# Patient Record
Sex: FEMALE | Race: WHITE | URBAN - METROPOLITAN AREA
[De-identification: names, ages, dates, MRNs, and addresses within clinical notes are randomized per-mention and may not be internally consistent; named-entity substitution may affect disease eponyms.]

---

## 2017-01-10 ENCOUNTER — EMERGENCY (EMERGENCY)
Facility: HOSPITAL | Age: 57
LOS: 0 days | Discharge: HOME | End: 2017-01-10
Admitting: EMERGENCY MEDICINE
Payer: COMMERCIAL

## 2017-01-10 DIAGNOSIS — R07.9 CHEST PAIN, UNSPECIFIED: ICD-10-CM

## 2017-01-10 DIAGNOSIS — R06.00 DYSPNEA, UNSPECIFIED: ICD-10-CM

## 2017-01-10 DIAGNOSIS — R06.02 SHORTNESS OF BREATH: ICD-10-CM

## 2017-01-10 PROCEDURE — 71020: CPT | Mod: 26

## 2017-01-10 PROCEDURE — 93010 ELECTROCARDIOGRAM REPORT: CPT

## 2017-01-10 PROCEDURE — 99285 EMERGENCY DEPT VISIT HI MDM: CPT

## 2019-04-03 ENCOUNTER — TRANSCRIPTION ENCOUNTER (OUTPATIENT)
Age: 59
End: 2019-04-03

## 2019-07-09 ENCOUNTER — INPATIENT (INPATIENT)
Facility: HOSPITAL | Age: 59
LOS: 0 days | Discharge: HOME | End: 2019-07-10
Attending: INTERNAL MEDICINE | Admitting: FAMILY MEDICINE
Payer: COMMERCIAL

## 2019-07-09 VITALS — HEIGHT: 66 IN | WEIGHT: 160.06 LBS

## 2019-07-09 DIAGNOSIS — Z98.890 OTHER SPECIFIED POSTPROCEDURAL STATES: Chronic | ICD-10-CM

## 2019-07-09 LAB
ALBUMIN SERPL ELPH-MCNC: 4.4 G/DL — SIGNIFICANT CHANGE UP (ref 3.3–5)
ALP SERPL-CCNC: 76 U/L — SIGNIFICANT CHANGE UP (ref 40–120)
ALT FLD-CCNC: 24 U/L — SIGNIFICANT CHANGE UP (ref 12–78)
ANION GAP SERPL CALC-SCNC: 7 MMOL/L — SIGNIFICANT CHANGE UP (ref 5–17)
APTT BLD: 28 SEC — SIGNIFICANT CHANGE UP (ref 27.5–36.3)
AST SERPL-CCNC: 27 U/L — SIGNIFICANT CHANGE UP (ref 15–37)
BASOPHILS # BLD AUTO: 0.05 K/UL — SIGNIFICANT CHANGE UP (ref 0–0.2)
BASOPHILS NFR BLD AUTO: 0.8 % — SIGNIFICANT CHANGE UP (ref 0–2)
BILIRUB SERPL-MCNC: 0.5 MG/DL — SIGNIFICANT CHANGE UP (ref 0.2–1.2)
BUN SERPL-MCNC: 8 MG/DL — SIGNIFICANT CHANGE UP (ref 7–23)
CALCIUM SERPL-MCNC: 9 MG/DL — SIGNIFICANT CHANGE UP (ref 8.5–10.1)
CHLORIDE SERPL-SCNC: 106 MMOL/L — SIGNIFICANT CHANGE UP (ref 96–108)
CO2 SERPL-SCNC: 28 MMOL/L — SIGNIFICANT CHANGE UP (ref 22–31)
CREAT SERPL-MCNC: 0.98 MG/DL — SIGNIFICANT CHANGE UP (ref 0.5–1.3)
EOSINOPHIL # BLD AUTO: 0.1 K/UL — SIGNIFICANT CHANGE UP (ref 0–0.5)
EOSINOPHIL NFR BLD AUTO: 1.6 % — SIGNIFICANT CHANGE UP (ref 0–6)
GLUCOSE SERPL-MCNC: 99 MG/DL — SIGNIFICANT CHANGE UP (ref 70–99)
HCT VFR BLD CALC: 44.5 % — SIGNIFICANT CHANGE UP (ref 34.5–45)
HGB BLD-MCNC: 14.6 G/DL — SIGNIFICANT CHANGE UP (ref 11.5–15.5)
IMM GRANULOCYTES NFR BLD AUTO: 0.5 % — SIGNIFICANT CHANGE UP (ref 0–1.5)
INR BLD: 0.96 RATIO — SIGNIFICANT CHANGE UP (ref 0.88–1.16)
LYMPHOCYTES # BLD AUTO: 1.94 K/UL — SIGNIFICANT CHANGE UP (ref 1–3.3)
LYMPHOCYTES # BLD AUTO: 31.3 % — SIGNIFICANT CHANGE UP (ref 13–44)
MCHC RBC-ENTMCNC: 30.3 PG — SIGNIFICANT CHANGE UP (ref 27–34)
MCHC RBC-ENTMCNC: 32.8 GM/DL — SIGNIFICANT CHANGE UP (ref 32–36)
MCV RBC AUTO: 92.3 FL — SIGNIFICANT CHANGE UP (ref 80–100)
MONOCYTES # BLD AUTO: 0.33 K/UL — SIGNIFICANT CHANGE UP (ref 0–0.9)
MONOCYTES NFR BLD AUTO: 5.3 % — SIGNIFICANT CHANGE UP (ref 2–14)
NEUTROPHILS # BLD AUTO: 3.75 K/UL — SIGNIFICANT CHANGE UP (ref 1.8–7.4)
NEUTROPHILS NFR BLD AUTO: 60.5 % — SIGNIFICANT CHANGE UP (ref 43–77)
PLATELET # BLD AUTO: 229 K/UL — SIGNIFICANT CHANGE UP (ref 150–400)
POTASSIUM SERPL-MCNC: 3.9 MMOL/L — SIGNIFICANT CHANGE UP (ref 3.5–5.3)
POTASSIUM SERPL-SCNC: 3.9 MMOL/L — SIGNIFICANT CHANGE UP (ref 3.5–5.3)
PROT SERPL-MCNC: 8 GM/DL — SIGNIFICANT CHANGE UP (ref 6–8.3)
PROTHROM AB SERPL-ACNC: 10.6 SEC — SIGNIFICANT CHANGE UP (ref 10–12.9)
RBC # BLD: 4.82 M/UL — SIGNIFICANT CHANGE UP (ref 3.8–5.2)
RBC # FLD: 12.3 % — SIGNIFICANT CHANGE UP (ref 10.3–14.5)
SODIUM SERPL-SCNC: 141 MMOL/L — SIGNIFICANT CHANGE UP (ref 135–145)
WBC # BLD: 6.2 K/UL — SIGNIFICANT CHANGE UP (ref 3.8–10.5)
WBC # FLD AUTO: 6.2 K/UL — SIGNIFICANT CHANGE UP (ref 3.8–10.5)

## 2019-07-09 PROCEDURE — 70498 CT ANGIOGRAPHY NECK: CPT | Mod: 26

## 2019-07-09 PROCEDURE — 93010 ELECTROCARDIOGRAM REPORT: CPT

## 2019-07-09 PROCEDURE — 99285 EMERGENCY DEPT VISIT HI MDM: CPT

## 2019-07-09 PROCEDURE — 71045 X-RAY EXAM CHEST 1 VIEW: CPT | Mod: 26

## 2019-07-09 PROCEDURE — 70450 CT HEAD/BRAIN W/O DYE: CPT | Mod: 26,59

## 2019-07-09 PROCEDURE — 70551 MRI BRAIN STEM W/O DYE: CPT | Mod: 26

## 2019-07-09 PROCEDURE — 70496 CT ANGIOGRAPHY HEAD: CPT | Mod: 26

## 2019-07-09 RX ORDER — SODIUM CHLORIDE 9 MG/ML
1000 INJECTION INTRAMUSCULAR; INTRAVENOUS; SUBCUTANEOUS
Refills: 0 | Status: DISCONTINUED | OUTPATIENT
Start: 2019-07-09 | End: 2019-07-09

## 2019-07-09 RX ORDER — ALPRAZOLAM 0.25 MG
0.25 TABLET ORAL THREE TIMES A DAY
Refills: 0 | Status: DISCONTINUED | OUTPATIENT
Start: 2019-07-09 | End: 2019-07-10

## 2019-07-09 RX ORDER — ONDANSETRON 8 MG/1
4 TABLET, FILM COATED ORAL EVERY 4 HOURS
Refills: 0 | Status: DISCONTINUED | OUTPATIENT
Start: 2019-07-09 | End: 2019-07-10

## 2019-07-09 RX ORDER — ATORVASTATIN CALCIUM 80 MG/1
20 TABLET, FILM COATED ORAL AT BEDTIME
Refills: 0 | Status: DISCONTINUED | OUTPATIENT
Start: 2019-07-09 | End: 2019-07-10

## 2019-07-09 RX ORDER — DOCUSATE SODIUM 100 MG
100 CAPSULE ORAL THREE TIMES A DAY
Refills: 0 | Status: DISCONTINUED | OUTPATIENT
Start: 2019-07-09 | End: 2019-07-10

## 2019-07-09 RX ORDER — FENTANYL CITRATE 50 UG/ML
1 INJECTION INTRAVENOUS
Refills: 0 | Status: DISCONTINUED | OUTPATIENT
Start: 2019-07-09 | End: 2019-07-10

## 2019-07-09 RX ORDER — ASPIRIN/CALCIUM CARB/MAGNESIUM 324 MG
81 TABLET ORAL DAILY
Refills: 0 | Status: DISCONTINUED | OUTPATIENT
Start: 2019-07-10 | End: 2019-07-10

## 2019-07-09 RX ORDER — FENTANYL CITRATE 50 UG/ML
1 INJECTION INTRAVENOUS
Refills: 0 | Status: DISCONTINUED | OUTPATIENT
Start: 2019-07-09 | End: 2019-07-09

## 2019-07-09 RX ORDER — MECLIZINE HCL 12.5 MG
25 TABLET ORAL THREE TIMES A DAY
Refills: 0 | Status: DISCONTINUED | OUTPATIENT
Start: 2019-07-09 | End: 2019-07-10

## 2019-07-09 RX ORDER — ASPIRIN/CALCIUM CARB/MAGNESIUM 324 MG
325 TABLET ORAL ONCE
Refills: 0 | Status: COMPLETED | OUTPATIENT
Start: 2019-07-09 | End: 2019-07-09

## 2019-07-09 RX ORDER — NICOTINE POLACRILEX 2 MG
1 GUM BUCCAL DAILY
Refills: 0 | Status: DISCONTINUED | OUTPATIENT
Start: 2019-07-09 | End: 2019-07-10

## 2019-07-09 RX ORDER — OXYCODONE AND ACETAMINOPHEN 5; 325 MG/1; MG/1
2 TABLET ORAL EVERY 6 HOURS
Refills: 0 | Status: DISCONTINUED | OUTPATIENT
Start: 2019-07-09 | End: 2019-07-10

## 2019-07-09 RX ORDER — LANOLIN ALCOHOL/MO/W.PET/CERES
5 CREAM (GRAM) TOPICAL ONCE
Refills: 0 | Status: COMPLETED | OUTPATIENT
Start: 2019-07-09 | End: 2019-07-09

## 2019-07-09 RX ORDER — ACETAMINOPHEN 500 MG
650 TABLET ORAL EVERY 6 HOURS
Refills: 0 | Status: DISCONTINUED | OUTPATIENT
Start: 2019-07-09 | End: 2019-07-10

## 2019-07-09 RX ORDER — CYCLOBENZAPRINE HYDROCHLORIDE 10 MG/1
5 TABLET, FILM COATED ORAL THREE TIMES A DAY
Refills: 0 | Status: DISCONTINUED | OUTPATIENT
Start: 2019-07-09 | End: 2019-07-10

## 2019-07-09 RX ORDER — FENTANYL CITRATE 50 UG/ML
1 INJECTION INTRAVENOUS
Refills: 0 | Status: DISCONTINUED | OUTPATIENT
Start: 2019-07-11 | End: 2019-07-10

## 2019-07-09 RX ORDER — PANTOPRAZOLE SODIUM 20 MG/1
40 TABLET, DELAYED RELEASE ORAL
Refills: 0 | Status: DISCONTINUED | OUTPATIENT
Start: 2019-07-09 | End: 2019-07-10

## 2019-07-09 RX ADMIN — Medication 1 TABLET(S): at 20:19

## 2019-07-09 RX ADMIN — Medication 5 MILLIGRAM(S): at 23:40

## 2019-07-09 RX ADMIN — ATORVASTATIN CALCIUM 20 MILLIGRAM(S): 80 TABLET, FILM COATED ORAL at 22:40

## 2019-07-09 RX ADMIN — OXYCODONE AND ACETAMINOPHEN 2 TABLET(S): 5; 325 TABLET ORAL at 22:40

## 2019-07-09 RX ADMIN — Medication 325 MILLIGRAM(S): at 16:43

## 2019-07-09 RX ADMIN — SODIUM CHLORIDE 125 MILLILITER(S): 9 INJECTION INTRAMUSCULAR; INTRAVENOUS; SUBCUTANEOUS at 16:36

## 2019-07-09 RX ADMIN — OXYCODONE AND ACETAMINOPHEN 2 TABLET(S): 5; 325 TABLET ORAL at 23:10

## 2019-07-09 RX ADMIN — Medication 1 PATCH: at 20:16

## 2019-07-09 RX ADMIN — FENTANYL CITRATE 1 PATCH: 50 INJECTION INTRAVENOUS at 21:08

## 2019-07-09 NOTE — H&P ADULT - ASSESSMENT
60 yo female with pmh/o HLD, Migrane headaches, PVC/PAC's, admitted for TIA in setting of vertigo associated with transient parasthesias/blurry vision/unstable gait/vertigo.     1) TIA  Symptoms have since resolved  Admit to telemetry  CT head reviewed-no acute findings  MRI/MRA ordered  TTE ordered  HbA1c and lipid panel  fall and aspiration precautions  Bedside swallow eval passed  ASA and statin started  Lowfat DASH/TLC diet  PT evaluation  Neurochecks q 4 hrs  cbc, cmp, coags for AM  VCD boots for DVT prophylaxis  Allow for permissive HTN    2) HLD  Cont. statin  DASH/TLC diet     3) Migraine Headaches  Pain control  Currently not in exacerbation    4) Need for prophylactic measure  VCD boots  Ambulate with assistance 58 yo female with pmh/o HLD, Migrane headaches, PVC/PAC's, admitted for concern of TIA in setting of vertigo associated with transient parasthesias/blurry vision/unstable gait/vertigo.     1) Intractable vertigo concerning for TIA  Symptoms have since resolved  Admit to telemetry  CT head reviewed-no acute findings  MRI/MRA ordered  TTE ordered  HbA1c and lipid panel  fall and aspiration precautions  Bedside swallow eval passed  ASA and statin started  Lowfat DASH/TLC diet  PT evaluation  Neurochecks q 4 hrs  cbc, cmp, coags for AM  VCD boots for DVT prophylaxis  Allow for permissive HTN    2) HLD  Cont. statin  DASH/TLC diet     3) Migraine Headaches  Pain control  Currently not in exacerbation    4) Need for prophylactic measure  VCD boots  Ambulate with assistance 58 yo female with pmh/o HLD, Migrane headaches, PVC/PAC's, admitted for concern of CVA in setting of vertigo associated with transient parasthesias/blurry vision/unstable gait/vertigo.     1) Intractable vertigo concerning for CVA  Symptoms have since resolved  Admit to telemetry  Neurology consult appreciated- Dr. Nichols  CT head reviewed-no acute findings  MRI/MRA ordered  TTE ordered  HbA1c and lipid panel  fall and aspiration precautions  Bedside swallow eval passed  ASA and statin started  Meclizine prn   Zofran prn  Lowfat DASH/TLC diet  PT evaluation  Neurochecks q 4 hrs  cbc, cmp, coags for AM  VCD boots for DVT prophylaxis  Allow for permissive HTN    2) HLD  Cont. statin  DASH/TLC diet     3) Migraine Headaches  Pain control  Currently not in exacerbation    4) Need for prophylactic measure  VCD boots  Ambulate with assistance 60 yo female with pmh/o HLD, Migrane headaches, PVC/PAC's, admitted for concern of CVA in setting of vertigo associated with transient parasthesias/blurry vision/unstable gait/vertigo.     1) Intractable vertigo concerning for CVA  Symptoms have since resolved  Admit to telemetry  Neurology consult appreciated- Dr. Nichols  CT head reviewed-no acute findings  MRI ordered  TTE ordered  HbA1c and lipid panel  fall and aspiration precautions  Bedside swallow eval passed  ASA and statin started  Meclizine prn   Zofran prn  Lowfat DASH/TLC diet  PT evaluation  Neurochecks q 4 hrs  cbc, cmp, coags for AM  VCD boots for DVT prophylaxis  Allow for permissive HTN    2) HLD  Statin started  DASH/TLC diet     3) Migraine Headaches  Pain control  Currently not in exacerbation    4) Smoker with h/o COPD  cessation counseling provided  risks of e-cig usage explained  pathophysiology of COPD explained-pt states she already has 'scar tissue' in lungs and was not aware that COPD is a chronic disease, pt now agreeable to stop smokeless tobacco use    5) Fibromyalgia/Lumbago  cont percocet  cont xanax  cont fentanyl  ISTOP confirmed- pt no longer seeing Daquan, now seeing Landon regularly    6) Need for prophylactic measure  VCD boots  Ambulate with assistance

## 2019-07-09 NOTE — ED STATDOCS - PROGRESS NOTE DETAILS
signed Sonya Neville PA-C Pt seen initially in intake by Dr Anders.   59F c/o severe imbalance, feeling like she was on a boat, unable to walk since she woke up this morning around 7:30am, she felt normal when she went to bed at 2am. Pt also notes it felt like everything was spinning to the left today. Pt notes that yesterday after she woke up around 7 or 8am and had a sudden, severe sensation that her face and body were being pulled to the left, her vision went blurry and the room was spinning. Lasted about 2-5 seconds. pt notes she felt a little "off" the rest of the day. PMH ocular migraines, tachycardia which she takes metoprolol for. pt notes her symptoms are improving today from this morning though she is still unsteady on her feet. Denies HA, pain, SOB. Pt alert, NAD. Neuro exam significant for +LUE dysmetria and LUE drift, slightly ataxic gait. No significant findings on labs, imaging, or EKG. Discussed case with Dr Nichols, will see pt in ED in consult, advises Aspirin, inpt MRI. Pt agrees with admission and plan of care. PMD- none, Card Norma, neuro- none signed Sonya Neville PA-C   Alerted by nursing pt had change in mental status while having an IV adjusted/placed. pt seemed to lose consciousness for a few seconds, then awoke and was confused where she was. Pt returned to normal within 1-2 minutes. fingerstick 89. pt sent to Main ED for further monitoring. Symptoms may have been due to vagal response, pt and  both notes she is "bad with needles". pt admitted to hospitalist for inpt workup r/o stroke by Dr Anders.

## 2019-07-09 NOTE — H&P ADULT - HISTORY OF PRESENT ILLNESS
Pt is a 59 year old female with a pmh/o HLD, COPD, vertigo, migraines, fibromyalgia, PAC's and PVC's, chronic lumbar back pain s/p disc and laminectomy, Goiter, Head injury s/p fall of horse, baseline 'double vision from near sightedness', who is an everyday smoker of approx. 18mcg of nicotene/day via e-cig use, who presented to ED after having several episodes of "severe and catastrophic vertigo" associated with sensation of the room spinning, leg weakness, gait instability, and left sided weakness which she states is worse than her baseline s/p laminectomy/lumbago. Pt states she had one episode yesterday which lasted only a few seconds and self resolved however states that she was 'severely scared' and did not ambulate the rest of the day. Pt states another episode happened today prompting her visit to the ED as she still has not regained full strength on left since yesterday. Pt denies any chest pain, palpitations, auditory or speech changes, n/v/d, constipation, HA, fever, diaphoresis, tinnitus, falls. Pt endorses decreased sensation and weakness of left side, worsening blurry vision, unsteady gait.

## 2019-07-09 NOTE — ED ADULT NURSE NOTE - NSIMPLEMENTINTERV_GEN_ALL_ED
Implemented All Universal Safety Interventions:  [place] to call system. Call bell, personal items and telephone within reach. Instruct patient to call for assistance. Room bathroom lighting operational. Non-slip footwear when patient is off stretcher. Physically safe environment: no spills, clutter or unnecessary equipment. Stretcher in lowest position, wheels locked, appropriate side rails in place.

## 2019-07-09 NOTE — H&P ADULT - NEUROLOGICAL DETAILS
cranial nerves intact/strength decreased/alert and oriented x 3/superficial reflexes intact/responds to pain/responds to verbal commands/no spontaneous movement

## 2019-07-09 NOTE — ED STATDOCS - NEUROLOGICAL, MLM
sensation is normal and strength is normal. CN II-XII intact. Normal FTN intact on the right minor dysmetria on the left. sensation is normal and strength is normal. CN II-XII intact. Normal FTN intact on the right minor dysmetria on the left. GCS=15 NIH=0 sensation is normal and strength is normal. CN II-XII intact. Normal FTN intact on the right. minor dysmetria on the left. GCS=15 NIH=0

## 2019-07-09 NOTE — ED STATDOCS - EYES, MLM
clear bilaterally.  Pupils equal, round, and reactive to light. EOMI. Visual field intact. Rotary nystagmus.

## 2019-07-09 NOTE — ED STATDOCS - OBJECTIVE STATEMENT
60 y/o F with hx of migraines, HLD, PVCs, and PACs (controlled on Metoprolol) presents to the ED with  c/o an episode of significant dizziness onset yesterday while reading the newspaper sitting down. She describes feeling a room spinning sensation and the sensation of being pulled to the left with associated blurred vision. The episode lasted ~4 seconds and was accompanied by elevated HR after.  There is no associated numbness however she describes some facial paraesthesias. After the episode pt felt completely fine and went about her day as she usually does. This morning when she awoke she again had vertigo and was very unsteady on her feet. This has not resolved today which prompted her arrival in the ED. Pt denies any fevers, chills, recent illness, CP, SOB, cough, congestion, abdominal pain, n/v/d, urinary sx's, back pain, neck pain, HA, or other musculoskeletal pain.  FmHx of Parkinson's. No further complaints at this time.

## 2019-07-09 NOTE — ED STATDOCS - MUSCULOSKELETAL, MLM
range of motion is not limited and there is no muscle tenderness. range of motion is not limited and there is no muscle tenderness. 5/5 strength on flexion and extension of all limbs. No nuchal rigidity.

## 2019-07-09 NOTE — CONSULT NOTE ADULT - ASSESSMENT
Ms. Velasquez is a 59 year old woman who had acute onset of a sensation of her body pulling to the left on 7/8/19.  Upon awakening on 7/9/19 she had symptoms of vertigo.  On examination she does have some mild left sided drift and dysmetria raising a concern for a central cause of vertigo such as stroke.  She was given aspirin.  CTA head and neck is being done and she is being admitted for MRI brain to rule out infarct.  Meclizine can be used as needed for vertigo.  Will follow.

## 2019-07-09 NOTE — H&P ADULT - NSICDXFAMILYHX_GEN_ALL_CORE_FT
FAMILY HISTORY:  Family history of Parkinson's disease FAMILY HISTORY:  Family history of neuromuscular dysfunction, in mother   Family history of Parkinson's disease, , father/grandfather

## 2019-07-09 NOTE — ED STATDOCS - CARE PLAN
Principal Discharge DX:	Vertigo  Secondary Diagnosis:	Lightheadedness  Secondary Diagnosis:	Syncope, unspecified syncope type

## 2019-07-09 NOTE — ED ADULT NURSE REASSESSMENT NOTE - NS ED NURSE REASSESS COMMENT FT1
Patient in chair, while trying to place new IV, pt syncopal episode, with confusion.  at bedside. pt continually with periods of confusion. MD aware. BGM 89. pt transfer pt to [facility]

## 2019-07-09 NOTE — H&P ADULT - NSICDXPASTMEDICALHX_GEN_ALL_CORE_FT
PAST MEDICAL HISTORY:  Fibromyalgia     HLD (hyperlipidemia)     Migraines     Premature atrial complex     Smokeless tobacco use     Ventricular premature beats

## 2019-07-09 NOTE — ED ADULT NURSE REASSESSMENT NOTE - NS ED NURSE REASSESS COMMENT FT1
pt. received in report from Ras Miranda and rounded on, pt. c/o of being cold, warm blankets applied for comfortable. pt. laying flat s/p episode of vertigo/dizziness.

## 2019-07-09 NOTE — H&P ADULT - NSHPOUTPATIENTPROVIDERS_GEN_ALL_CORE
SNEHAL Langford  Neurology  PMD   Neurology- None   Pain manangement- Dr. Meño Butt PMD Dr. Green  Neurology- None   Pain manangement- Dr. Meño Butt/Dr. Navarrete

## 2019-07-09 NOTE — CONSULT NOTE ADULT - SUBJECTIVE AND OBJECTIVE BOX
Patient is a 59y old  Female who presents with a chief complaint of vertigo.    HPI: Ms. Velasquez is a 59 year old woman who states that on 19 she was in her usual state of health until she had a "catastrophic event".  She was reading the paper while sitting down and felt a violent sensation of her whole body being pulled to the left. It lasted for only 3-5 seconds but she states that after that she had an "adrenaline dump" because it was so scary and felt off for the rest of the day.  She went to bed at about 2 AM. At about 8-9 AM she tried getting out of bed and had difficulty. Her legs gave out. They both felt weak and she had a sensation of vertigo as if the room was spinning to the left. She had difficulty walking. This sensation is slightly improved. It feels as if everything is spinning to the left. She feels slightly better when she tilts her head to the right.  She has no tinnitus at this time (has had it in the past). She is not aware of any recent viral infections, air or water travel.  She does think that she may have some mild left sided weakness but is not clear if it is related to disc disease.  She states that she has some double vision that is worse today.      PAST MEDICAL & SURGICAL HISTORY:  Smokeless tobacco use  Ventricular premature beats  Premature atrial complex  Migraines  HLD (hyperlipidemia)  Fibromyalgia  H/O discectomy  H/O laminectomy  "inflammatory conditions"      FAMILY HISTORY:  Family history of neuromuscular dysfunction: in mother   Family history of Parkinson's disease: , father/grandfather    Social Hx:  Quit cigarette smoking about 5 years ago but vapes. Drinks 1 glass of wine per night.       MEDICATIONS  (Home):  metoprolol XR 20 mg daily  Percocet 10/325 mg q6  Fentanyl patch 125 mg q48  Xanax 0.25-0.5 mg 1 tab q8 prn (states she does not take it every day)         Allergies    Effexor (Unknown)  Motrin (Hives; Fever)  Nucynta (Unknown)  predniSONE (Unknown)    Intolerances        ROS: Pertinent positives in HPI, all other ROS were reviewed and are negative.      Vital Signs Last 24 Hrs  T(C): 36.8 (2019 14:06), Max: 36.8 (2019 14:06)  T(F): 98.2 (2019 14:06), Max: 98.2 (2019 14:06)  HR: 65 (2019 14:06) (65 - 65)  BP: 138/79 (2019 14:06) (138/79 - 138/79)  BP(mean): --  RR: 18 (2019 14:06) (18 - 18)  SpO2: 100% (2019 14:06) (100% - 100%)        Constitutional: awake and alert.  HEENT: PERRLA, EOMI,   Neck: Supple.  Respiratory: Breath sounds are clear bilaterally  Cardiovascular: S1 and S2, regular / irregular rhythm  Gastrointestinal: soft, nontender  Extremities:  no edema  Vascular: Caritid Bruit - no  Musculoskeletal: no joint swelling/tenderness, no abnormal movements  Skin: No rashes    Neurological exam:  HF: A x O x 3. Appropriately interactive, normal affect. Speech fluent, No Aphasia or paraphasic errors. Naming /repetition intact   CN: DADA, EOMI, VFF, facial sensation normal, no NLFD, tongue midline, Palate moves equally, SCM equal bilaterally  Motor: Mild left pronator drift Strength 5/5 in bilateral upper extremities on confrontation testing. 5/5 in RLE, 5-/5 in proximal left lower extremity, normal bulk and tone, no tremor, rigidity or bradykinesia.    Sens: Intact to light touch  Reflexes: Symmetric and normal . BJ 2, BR 2, KJ 1, AJ trace, downgoing toes b/l  Coord:  left worse than right dysmetria on finger nose finger  Gait/Balance: not tested        Labs:       141  |  106  |  8   ----------------------------<  99  3.9   |  28  |  0.98    Ca    9.0      2019 15:30    TPro  8.0  /  Alb  4.4  /  TBili  0.5  /  DBili  x   /  AST  27  /  ALT  24  /  AlkPhos  76                                14.6   6.20  )-----------( 229      ( 2019 15:30 )             44.5       Radiology:  CT brain 19: unremarkable

## 2019-07-09 NOTE — ED STATDOCS - NS_ ATTENDINGSCRIBEDETAILS _ED_A_ED_FT
I Guanako Anders MD saw and examined the patient. Scribe documented for me and under my supervision. I have modified the scribe's documentation where necessary to reflect my history, physical exam and other relevant documentations pertinent to the care of the patient.

## 2019-07-09 NOTE — H&P ADULT - NSHPSOCIALHISTORY_GEN_ALL_CORE
ADL's independent  Uses smokeless tabacco- E cigs   ADL's independent  Uses smokeless tabacco- E cigs  Daily glass of wine

## 2019-07-09 NOTE — ED STATDOCS - ATTENDING CONTRIBUTION TO CARE
I Guanako Anders MD saw and examined the patient. MLP saw and examined the patient under my supervision. I discussed the care of the patient with MLP and agree with MLP's plan, assessment and care of the patient while in the ED.

## 2019-07-10 ENCOUNTER — TRANSCRIPTION ENCOUNTER (OUTPATIENT)
Age: 59
End: 2019-07-10

## 2019-07-10 VITALS
HEART RATE: 84 BPM | SYSTOLIC BLOOD PRESSURE: 137 MMHG | OXYGEN SATURATION: 100 % | RESPIRATION RATE: 18 BRPM | TEMPERATURE: 97 F | DIASTOLIC BLOOD PRESSURE: 71 MMHG

## 2019-07-10 LAB
ALBUMIN SERPL ELPH-MCNC: 3.5 G/DL — SIGNIFICANT CHANGE UP (ref 3.3–5)
ALP SERPL-CCNC: 68 U/L — SIGNIFICANT CHANGE UP (ref 40–120)
ALT FLD-CCNC: 22 U/L — SIGNIFICANT CHANGE UP (ref 12–78)
ANION GAP SERPL CALC-SCNC: 6 MMOL/L — SIGNIFICANT CHANGE UP (ref 5–17)
APTT BLD: 28.7 SEC — SIGNIFICANT CHANGE UP (ref 27.5–36.3)
AST SERPL-CCNC: 22 U/L — SIGNIFICANT CHANGE UP (ref 15–37)
BASOPHILS # BLD AUTO: 0.04 K/UL — SIGNIFICANT CHANGE UP (ref 0–0.2)
BASOPHILS NFR BLD AUTO: 0.6 % — SIGNIFICANT CHANGE UP (ref 0–2)
BILIRUB DIRECT SERPL-MCNC: 0.1 MG/DL — SIGNIFICANT CHANGE UP (ref 0–0.2)
BILIRUB INDIRECT FLD-MCNC: 0.4 MG/DL — SIGNIFICANT CHANGE UP (ref 0.2–1)
BILIRUB SERPL-MCNC: 0.5 MG/DL — SIGNIFICANT CHANGE UP (ref 0.2–1.2)
BUN SERPL-MCNC: 11 MG/DL — SIGNIFICANT CHANGE UP (ref 7–23)
CALCIUM SERPL-MCNC: 8.7 MG/DL — SIGNIFICANT CHANGE UP (ref 8.5–10.1)
CHLORIDE SERPL-SCNC: 109 MMOL/L — HIGH (ref 96–108)
CHOLEST SERPL-MCNC: 227 MG/DL — HIGH (ref 10–199)
CO2 SERPL-SCNC: 26 MMOL/L — SIGNIFICANT CHANGE UP (ref 22–31)
CREAT SERPL-MCNC: 0.74 MG/DL — SIGNIFICANT CHANGE UP (ref 0.5–1.3)
EOSINOPHIL # BLD AUTO: 0.25 K/UL — SIGNIFICANT CHANGE UP (ref 0–0.5)
EOSINOPHIL NFR BLD AUTO: 3.9 % — SIGNIFICANT CHANGE UP (ref 0–6)
GLUCOSE SERPL-MCNC: 85 MG/DL — SIGNIFICANT CHANGE UP (ref 70–99)
HBA1C BLD-MCNC: 5.6 % — SIGNIFICANT CHANGE UP (ref 4–5.6)
HCT VFR BLD CALC: 40.3 % — SIGNIFICANT CHANGE UP (ref 34.5–45)
HCV AB S/CO SERPL IA: 0.14 S/CO — SIGNIFICANT CHANGE UP (ref 0–0.99)
HCV AB SERPL-IMP: SIGNIFICANT CHANGE UP
HDLC SERPL-MCNC: 45 MG/DL — LOW
HGB BLD-MCNC: 13 G/DL — SIGNIFICANT CHANGE UP (ref 11.5–15.5)
IMM GRANULOCYTES NFR BLD AUTO: 0.5 % — SIGNIFICANT CHANGE UP (ref 0–1.5)
INR BLD: 1.02 RATIO — SIGNIFICANT CHANGE UP (ref 0.88–1.16)
LIPID PNL WITH DIRECT LDL SERPL: 142 MG/DL — HIGH
LYMPHOCYTES # BLD AUTO: 2.57 K/UL — SIGNIFICANT CHANGE UP (ref 1–3.3)
LYMPHOCYTES # BLD AUTO: 40.5 % — SIGNIFICANT CHANGE UP (ref 13–44)
MCHC RBC-ENTMCNC: 29.8 PG — SIGNIFICANT CHANGE UP (ref 27–34)
MCHC RBC-ENTMCNC: 32.3 GM/DL — SIGNIFICANT CHANGE UP (ref 32–36)
MCV RBC AUTO: 92.4 FL — SIGNIFICANT CHANGE UP (ref 80–100)
MONOCYTES # BLD AUTO: 0.42 K/UL — SIGNIFICANT CHANGE UP (ref 0–0.9)
MONOCYTES NFR BLD AUTO: 6.6 % — SIGNIFICANT CHANGE UP (ref 2–14)
NEUTROPHILS # BLD AUTO: 3.04 K/UL — SIGNIFICANT CHANGE UP (ref 1.8–7.4)
NEUTROPHILS NFR BLD AUTO: 47.9 % — SIGNIFICANT CHANGE UP (ref 43–77)
PLATELET # BLD AUTO: 192 K/UL — SIGNIFICANT CHANGE UP (ref 150–400)
POTASSIUM SERPL-MCNC: 4 MMOL/L — SIGNIFICANT CHANGE UP (ref 3.5–5.3)
POTASSIUM SERPL-SCNC: 4 MMOL/L — SIGNIFICANT CHANGE UP (ref 3.5–5.3)
PROT SERPL-MCNC: 6.6 GM/DL — SIGNIFICANT CHANGE UP (ref 6–8.3)
PROTHROM AB SERPL-ACNC: 11.3 SEC — SIGNIFICANT CHANGE UP (ref 10–12.9)
RBC # BLD: 4.36 M/UL — SIGNIFICANT CHANGE UP (ref 3.8–5.2)
RBC # FLD: 12.3 % — SIGNIFICANT CHANGE UP (ref 10.3–14.5)
SODIUM SERPL-SCNC: 141 MMOL/L — SIGNIFICANT CHANGE UP (ref 135–145)
TOTAL CHOLESTEROL/HDL RATIO MEASUREMENT: 5 RATIO — SIGNIFICANT CHANGE UP (ref 3.3–7.1)
TRIGL SERPL-MCNC: 198 MG/DL — HIGH (ref 10–149)
TSH SERPL-MCNC: 0.58 UU/ML — SIGNIFICANT CHANGE UP (ref 0.34–4.82)
WBC # BLD: 6.35 K/UL — SIGNIFICANT CHANGE UP (ref 3.8–10.5)
WBC # FLD AUTO: 6.35 K/UL — SIGNIFICANT CHANGE UP (ref 3.8–10.5)

## 2019-07-10 PROCEDURE — 93306 TTE W/DOPPLER COMPLETE: CPT | Mod: 26

## 2019-07-10 PROCEDURE — 99232 SBSQ HOSP IP/OBS MODERATE 35: CPT

## 2019-07-10 RX ORDER — MECLIZINE HCL 12.5 MG
1 TABLET ORAL
Qty: 36 | Refills: 1
Start: 2019-07-10 | End: 2019-08-02

## 2019-07-10 RX ORDER — MECLIZINE HCL 12.5 MG
1 TABLET ORAL
Qty: 36 | Refills: 0
Start: 2019-07-10 | End: 2019-07-21

## 2019-07-10 RX ADMIN — Medication 1 PATCH: at 05:49

## 2019-07-10 RX ADMIN — FENTANYL CITRATE 1 PATCH: 50 INJECTION INTRAVENOUS at 05:48

## 2019-07-10 RX ADMIN — OXYCODONE AND ACETAMINOPHEN 2 TABLET(S): 5; 325 TABLET ORAL at 10:46

## 2019-07-10 RX ADMIN — OXYCODONE AND ACETAMINOPHEN 2 TABLET(S): 5; 325 TABLET ORAL at 16:53

## 2019-07-10 RX ADMIN — Medication 81 MILLIGRAM(S): at 12:22

## 2019-07-10 RX ADMIN — Medication 1 PATCH: at 13:08

## 2019-07-10 RX ADMIN — Medication 25 MILLIGRAM(S): at 06:59

## 2019-07-10 RX ADMIN — Medication 1 TABLET(S): at 12:22

## 2019-07-10 NOTE — PHYSICAL THERAPY INITIAL EVALUATION ADULT - PATIENT PROFILE REVIEW, REHAB EVAL
yes/pt reports h/o migraine HAs in her youth and later occular migraines with visual effects,has h/o spinal stenosis with back surgery 2012 at [facility]

## 2019-07-10 NOTE — PHYSICAL THERAPY INITIAL EVALUATION ADULT - ACTIVE RANGE OF MOTION EXAMINATION, REHAB EVAL
bilateral  lower extremity Active ROM was WFL (within functional limits)/aurora. upper extremity Active ROM was WNL (within normal limits)

## 2019-07-10 NOTE — SWALLOW BEDSIDE ASSESSMENT ADULT - COMMENTS
The patient was admitted to  with feeling that her body was pulling to the left, ambulation difficulties and dizziness. Neuro is following and found dysmetria on left/LLE weakness on exam. See below for prior medical history. The patient was admitted to  with a feeling that her body was pulling to the left, ambulation difficulties and dizziness. Neuro is following and found dysmetria on left/LLE weakness on exam. See below for prior medical history.

## 2019-07-10 NOTE — PROGRESS NOTE ADULT - ASSESSMENT
Ms. Velasquez is a 59 year old woman who had acute onset of a sensation of her body pulling to the left on 7/8/19.  Upon awakening on 7/9/19 she had symptoms of vertigo.  On examination she does have some mild left sided drift and dysmetria raising a concern for a central cause of vertigo such as stroke.  She was given aspirin.  CTA head and neck is being done and she is being admitted for MRI brain to rule out infarct.  Meclizine can be used as needed for vertigo.      7/10/19:  Symptoms are overall improved.  MRI brain shows no evidence of stroke.  Most likely benign paroxysmal positional vertigo.  Patient given reassurance. If symptoms do not resolve spontaneously, I would recommend that she has vestibular therapy.   Mild left arm weakness may be related to cervical spine pathology. Follow-up with pain management and get MRI cervical spine.  She can follow up in the office as needed.

## 2019-07-10 NOTE — PHYSICAL THERAPY INITIAL EVALUATION ADULT - GAIT DEVIATIONS NOTED, PT EVAL
one episode of mild transient dizziness on directional change to LEFT while turning head to LEFT side

## 2019-07-10 NOTE — PROGRESS NOTE ADULT - SUBJECTIVE AND OBJECTIVE BOX
CHIEF COMPLAINT/Diagnosis: vertigo w/ gait instability/ r/ocva/ blurry vision     SUBJECTIVE: no complaints    REVIEW OF SYSTEMS:    CONSTITUTIONAL: No weakness, fevers or chills  EYES/ENT: No visual changes;  No vertigo or throat pain   NECK: No pain or stiffness  RESPIRATORY: No cough, wheezing, hemoptysis; No shortness of breath  CARDIOVASCULAR: No chest pain or palpitations  GASTROINTESTINAL: No abdominal or epigastric pain. No nausea, vomiting, or hematemesis; No diarrhea or constipation. No melena or hematochezia.  GENITOURINARY: No dysuria, frequency or hematuria  NEUROLOGICAL: No numbness or weakness  SKIN: No itching, burning, rashes, or lesions   All other review of systems is negative unless indicated above    Vital Signs Last 24 Hrs  T(C): 36.6 (10 Jul 2019 04:37), Max: 36.8 (09 Jul 2019 14:06)  T(F): 97.9 (10 Jul 2019 04:37), Max: 98.3 (09 Jul 2019 20:21)  HR: 57 (10 Jul 2019 04:37) (57 - 85)  BP: 124/82 (10 Jul 2019 04:37) (124/82 - 166/83)  BP(mean): --  RR: 18 (10 Jul 2019 04:37) (16 - 18)  SpO2: 100% (10 Jul 2019 04:37) (100% - 100%)    I&O's Summary      CAPILLARY BLOOD GLUCOSE      POCT Blood Glucose.: 89 mg/dL (09 Jul 2019 16:51)      PHYSICAL EXAM:    Constitutional: NAD, awake and alert, well-developed  HEENT: PERR, EOMI, Normal Hearing, MMM  Neck: Soft and supple, No LAD, No JVD  Respiratory: Breath sounds are clear bilaterally, No wheezing, rales or rhonchi  Cardiovascular: S1 and S2, regular rate and rhythm, no Murmurs, gallops or rubs  Gastrointestinal: Bowel Sounds present, soft, nontender, nondistended, no guarding, no rebound  Extremities: No peripheral edema  Vascular: 2+ peripheral pulses  Neurological: A/O x 3, no focal deficits  Musculoskeletal: 5/5 strength b/l upper and lower extremities  Skin: No rashes    MEDICATIONS:  MEDICATIONS  (STANDING):  aspirin enteric coated 81 milliGRAM(s) Oral daily  atorvastatin 20 milliGRAM(s) Oral at bedtime  fentaNYL   Patch 100 MICROgram(s)/Hr 1 Patch Transdermal <User Schedule>  multivitamin 1 Tablet(s) Oral daily  nicotine -  14 mG/24Hr(s) Patch 1 patch Transdermal daily  pantoprazole    Tablet 40 milliGRAM(s) Oral before breakfast      LABS: All Labs Reviewed:                        13.0   6.35  )-----------( 192      ( 10 Jul 2019 06:51 )             40.3     07-10    141  |  109<H>  |  11  ----------------------------<  85  4.0   |  26  |  0.74    Ca    8.7      10 Jul 2019 06:51    TPro  6.6  /  Alb  3.5  /  TBili  0.5  /  DBili  0.1  /  AST  22  /  ALT  22  /  AlkPhos  68  07-10    PT/INR - ( 10 Jul 2019 06:51 )   PT: 11.3 sec;   INR: 1.02 ratio         PTT - ( 10 Jul 2019 06:51 )  PTT:28.7 sec  CARDIAC MARKERS ( 09 Jul 2019 15:30 )  <0.015 ng/mL / x     / x     / x     / x            Assessment and Plan:     58 yo female with pmh/o HLD, Migrane headaches, PVC/PAC's, admitted for concern of CVA in setting of vertigo associated with transient parasthesias/blurry vision/unstable gait/vertigo.     1) Intractable vertigo, Dizziness  rule out CVA vs peripheral issues vs cardiac rule out  Symptoms have since resolved  Admit to telemetry  Neurology consult appreciated- Dr. Nichols  CT head reviewed-no acute findings  MRI ordered  TTE ordered  HbA1c and lipid panel  NOTE: patient at home, is on several meds that can cause lethargy, blurry vision , unsteady gait >> Fentanyl patch, Flexiril, oxycodone ; Will have discussion with patient, about taper      2) HLD  Statin started  DASH/TLC diet     3) Migraine Headaches  Pain control  Currently not in exacerbation    4) Smoker with h/o COPD  cessation counseling provided  risks of e-cig usage explained  pathophysiology of COPD explained-pt states she already has 'scar tissue' in lungs and was not aware that COPD is a chronic disease, pt now agreeable to stop smokeless tobacco use    5) Fibromyalgia/Lumbago  cont percocet  cont xanax  cont fentanyl  ISTOP confirmed- pt no longer seeing Daquan, now seeing Landon regularly    6) Need for prophylactic measure  VCD boots  Ambulate with assistance

## 2019-07-10 NOTE — SWALLOW BEDSIDE ASSESSMENT ADULT - SWALLOW EVAL: DIAGNOSIS
1) The pt is alert and interactive but anxious at times. She stated that she feels as though her "speech sounds lopsided" when she speaks but could not elaborate on this statement. Pt also stated that she feels as though her jaw sometimes "waggles" but could not clarify what this meant. At the present time, she is oriented x 3+. The pt is able to verbalize during communicative probes and in conversation without evidence of a primary motor speech or primary linguistic pathology. She is communicatively competent.   2) The patient exhibits functional Oropharyngeal Swallowing abilities for age without overt Dysphagia or aspiration signs.

## 2019-07-10 NOTE — SWALLOW BEDSIDE ASSESSMENT ADULT - SLP GENERAL OBSERVATIONS
The pt is alert and interactive but anxious at times. She stated that she feels as though her "speech sounds lopsided" when she speaks but could not elaborate on this statement. Pt also stated that she feels as though her jaw sometimes "waggles" but could not clarify what this meant. At the present time, she is oriented x 3+. The pt is able to verbalize during communicative probes and in conversation without evidence of a primary motor speech or primary linguistic pathology. She is communicatively competent.

## 2019-07-10 NOTE — DISCHARGE NOTE PROVIDER - NSDCCPCAREPLAN_GEN_ALL_CORE_FT
PRINCIPAL DISCHARGE DIAGNOSIS  Diagnosis: Vertigo  Assessment and Plan of Treatment: follow up outpatient with Dr. Coughlin for ENT vestibular studies

## 2019-07-10 NOTE — PHYSICAL THERAPY INITIAL EVALUATION ADULT - OCCUPATION
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

## 2019-07-10 NOTE — PROGRESS NOTE ADULT - SUBJECTIVE AND OBJECTIVE BOX
Interval History:  7/10/19: She is feeling better overall but she did have an episode earlier of sudden weakness in legs and dizziness.      MEDICATIONS  (STANDING):  aspirin enteric coated 81 milliGRAM(s) Oral daily  atorvastatin 20 milliGRAM(s) Oral at bedtime  fentaNYL   Patch 100 MICROgram(s)/Hr 1 Patch Transdermal <User Schedule>  multivitamin 1 Tablet(s) Oral daily  nicotine -  14 mG/24Hr(s) Patch 1 patch Transdermal daily  pantoprazole    Tablet 40 milliGRAM(s) Oral before breakfast    MEDICATIONS  (PRN):  acetaminophen   Tablet .. 650 milliGRAM(s) Oral every 6 hours PRN Temp greater or equal to 38C (100.4F), Moderate Pain (4 - 6)  ALPRAZolam 0.25 milliGRAM(s) Oral three times a day PRN anxiety  cyclobenzaprine 5 milliGRAM(s) Oral three times a day PRN Muscle Spasm  docusate sodium 100 milliGRAM(s) Oral three times a day PRN Constipation  meclizine 25 milliGRAM(s) Oral three times a day PRN Dizziness  ondansetron Injectable 4 milliGRAM(s) IV Push every 4 hours PRN Nausea and/or Vomiting  oxyCODONE    5 mG/acetaminophen 325 mG 2 Tablet(s) Oral every 6 hours PRN Severe Pain (7 - 10)      Allergies    Effexor (Unknown)  Motrin (Hives; Fever)  Nucynta (Unknown)  predniSONE (Unknown)    Intolerances        PHYSICAL EXAM:  Vital Signs Last 24 Hrs  T(F): 97.9 (07-10-19 @ 04:37)  HR: 57 (07-10-19 @ 04:37)  BP: 124/82 (07-10-19 @ 04:37)  RR: 18 (07-10-19 @ 04:37)    GENERAL: NAD, well-groomed, well-developed  HEAD:  Atraumatic, Normocephalic  Neuro:  Awake, alert, no aphasia  CN: PERRL, EOMI, no nystagmus, no facial weakness, tongue protrudes in the midline  motor: Mild left drift. Full strength on confrontation testing  sensory: intact to light touch  coordination: dysmetria on left with finger nose finger      LABS:                        13.0   6.35  )-----------( 192      ( 10 Jul 2019 06:51 )             40.3     07-10    141  |  109<H>  |  11  ----------------------------<  85  4.0   |  26  |  0.74    Ca    8.7      10 Jul 2019 06:51    TPro  6.6  /  Alb  3.5  /  TBili  0.5  /  DBili  0.1  /  AST  22  /  ALT  22  /  AlkPhos  68  07-10    PT/INR - ( 10 Jul 2019 06:51 )   PT: 11.3 sec;   INR: 1.02 ratio         PTT - ( 10 Jul 2019 06:51 )  PTT:28.7 sec      RADIOLOGY & ADDITIONAL STUDIES:    CT brain 7/9/19: unremarkable    CTA head and neck 7/9/19:  normal    MR brain 7/9/19:  Normal

## 2019-07-10 NOTE — DISCHARGE NOTE PROVIDER - HOSPITAL COURSE
Vital Signs Last 24 Hrs    T(C): 37 (10 Jul 2019 11:28), Max: 37 (10 Jul 2019 11:28)    T(F): 98.6 (10 Jul 2019 11:28), Max: 98.6 (10 Jul 2019 11:28)    HR: 67 (10 Jul 2019 11:28) (57 - 85)    BP: 136/68 (10 Jul 2019 11:28) (124/82 - 166/83)    BP(mean): --    RR: 18 (10 Jul 2019 11:28) (16 - 18)    SpO2: 100% (10 Jul 2019 11:28) (100% - 100%)        HEENT:   pupils equal and reactive, EOMI, no oropharyngeal lesions, erythema, exudates, oral thrush        NECK:   supple, no carotid bruits, no palpable lymph nodes, no thyromegaly        CV:  +S1, +S2, regular, no murmurs or rubs        RESP:   lungs clear to auscultation bilaterally, no wheezing, rales, rhonchi, good air entry bilaterally        BREAST:  not examined        GI:  abdomen soft, non-tender, non-distended, normal BS, no bruits, no abdominal masses, no palpable masses        RECTAL:  not examined        :  not examined        MSK:   normal muscle tone, no atrophy, no rigidity, no contractions        EXT:   no clubbing, no cyanosis, no edema, no calf pain, swelling or erythema        VASCULAR:  pulses equal and symmetric in the upper and lower extremities        NEURO:  AAOX3, no focal neurological deficits, follows all commands, able to move extremities spontaneously        SKIN:  no ulcers, lesions or rashes        10 Jul 2019 06:51        141    |  109    |  11       ----------------------------<  85       4.0     |  26     |  0.74         Ca    8.7        10 Jul 2019 06:51        TPro  6.6    /  Alb  3.5    /  TBili  0.5    /  DBili  0.1    /  AST  22     /  ALT  22     /  AlkPhos  68     10 Jul 2019 06:51    LIVER FUNCTIONS - ( 10 Jul 2019 06:51 )    Alb: 3.5 g/dL / Pro: 6.6 gm/dL / ALK PHOS: 68 U/L / ALT: 22 U/L / AST: 22 U/L / GGT: x           PT/INR - ( 10 Jul 2019 06:51 )   PT: 11.3 sec;   INR: 1.02 ratio               PTT - ( 10 Jul 2019 06:51 )  PTT:28.7 secCBC Full  -  ( 10 Jul 2019 06:51 )    WBC Count : 6.35 K/uL    Hemoglobin : 13.0 g/dL    Hematocrit : 40.3 %    Platelet Count - Automated : 192 K/uL    Mean Cell Volume : 92.4 fl    Mean Cell Hemoglobin : 29.8 pg    Mean Cell Hemoglobin Concentration : 32.3 gm/dL                Hospital Course:        58 yo female with pmh/o HLD, Migrane headaches, PVC/PAC's, admitted for concern of CVA in setting of vertigo associated with transient parasthesias/blurry vision/unstable gait/vertigo. Admits to possible syncope.         1) Intractable vertigo, Dizziness  rule out CVA vs peripheral issues vs cardiac rule out    Symptoms have since resolved, no events on telemetry. MRI done to r/o cva and it is wnls. Blood work with in limits.     Neurology consult appreciated- Dr. Nichols. No further inpatient workup. Patient describes feeling like she is on a boat and room spinning. Patient likley with     peripheral symptoms, possible BPPV.

## 2019-07-10 NOTE — SWALLOW BEDSIDE ASSESSMENT ADULT - SWALLOW EVAL: CRITERIA FOR SKILLED INTERVENTION MET
NO NEED FOR SPEECH OR SWALLOWING THERAPY. PT'S SPEECH-LANGUAGE AND OROPHARYNGEAL SWALLOWING ABILITIES ARE WITHIN FUNCTIONAL PARAMETERS. GIVEN ABOVE, WILL NOT ACTIVELY FOLLOW. RECONSULT PRN.

## 2019-07-10 NOTE — SWALLOW BEDSIDE ASSESSMENT ADULT - SWALLOW EVAL: RECOMMENDED DIET
SUGGEST A REGULAR TEXTURE DIET WITH THIN LIQUID CONSISTENCIES AS THE PATIENT TOLERATES THESE FOOD CONSISTENCIES FROM AN OROPHARYNGEAL SWALLOWING STANCE ON CLINICAL EXAM.

## 2019-07-10 NOTE — DISCHARGE NOTE PROVIDER - CARE PROVIDER_API CALL
René Green (MD)  Cardiovascular Disease; Nuclear Cardiology  [address] [place] [address]  Phone: [phone]  Fax: [phone]  Follow Up Time:

## 2019-07-10 NOTE — DISCHARGE NOTE NURSING/CASE MANAGEMENT/SOCIAL WORK - NSDCDPATPORTLINK_GEN_ALL_CORE
You can access the [facility] Patient Portal, offered by [facility], by registering with the following website: [email or web address]

## 2019-07-15 DIAGNOSIS — I63.9 CEREBRAL INFARCTION, UNSPECIFIED: ICD-10-CM

## 2019-07-15 DIAGNOSIS — J44.9 CHRONIC OBSTRUCTIVE PULMONARY DISEASE, UNSPECIFIED: ICD-10-CM

## 2019-07-15 DIAGNOSIS — R55 SYNCOPE AND COLLAPSE: ICD-10-CM

## 2019-07-15 DIAGNOSIS — F17.290 NICOTINE DEPENDENCE, OTHER TOBACCO PRODUCT, UNCOMPLICATED: ICD-10-CM

## 2019-07-15 DIAGNOSIS — M79.7 FIBROMYALGIA: ICD-10-CM

## 2019-07-15 DIAGNOSIS — G43.909 MIGRAINE, UNSPECIFIED, NOT INTRACTABLE, WITHOUT STATUS MIGRAINOSUS: ICD-10-CM

## 2019-07-15 DIAGNOSIS — E78.5 HYPERLIPIDEMIA, UNSPECIFIED: ICD-10-CM

## 2019-07-15 DIAGNOSIS — H81.10 BENIGN PAROXYSMAL VERTIGO, UNSPECIFIED EAR: ICD-10-CM

## 2019-09-12 ENCOUNTER — APPOINTMENT (OUTPATIENT)
Dept: NEUROLOGY | Facility: CLINIC | Age: 59
End: 2019-09-12
Payer: COMMERCIAL

## 2019-09-12 VITALS
DIASTOLIC BLOOD PRESSURE: 71 MMHG | HEART RATE: 60 BPM | SYSTOLIC BLOOD PRESSURE: 115 MMHG | HEIGHT: 69 IN | WEIGHT: 148 LBS | BODY MASS INDEX: 21.92 KG/M2

## 2019-09-12 DIAGNOSIS — R26.89 OTHER ABNORMALITIES OF GAIT AND MOBILITY: ICD-10-CM

## 2019-09-12 DIAGNOSIS — R00.2 PALPITATIONS: ICD-10-CM

## 2019-09-12 DIAGNOSIS — G43.109 MIGRAINE WITH AURA, NOT INTRACTABLE, W/OUT STATUS MIGRAINOSUS: ICD-10-CM

## 2019-09-12 DIAGNOSIS — Z82.0 FAMILY HISTORY OF EPILEPSY AND OTHER DISEASES OF THE NERVOUS SYSTEM: ICD-10-CM

## 2019-09-12 DIAGNOSIS — Z82.5 FAMILY HISTORY OF ASTHMA AND OTHER CHRONIC LOWER RESPIRATORY DISEASES: ICD-10-CM

## 2019-09-12 DIAGNOSIS — R42 DIZZINESS AND GIDDINESS: ICD-10-CM

## 2019-09-12 DIAGNOSIS — I49.1 ATRIAL PREMATURE DEPOLARIZATION: ICD-10-CM

## 2019-09-12 DIAGNOSIS — G43.009 MIGRAINE W/OUT AURA, NOT INTRACTABLE, W/OUT STATUS MIGRAINOSUS: ICD-10-CM

## 2019-09-12 PROBLEM — E78.5 HYPERLIPIDEMIA, UNSPECIFIED: Chronic | Status: ACTIVE | Noted: 2019-07-20

## 2019-09-12 PROBLEM — Z72.0 TOBACCO USE: Chronic | Status: ACTIVE | Noted: 2019-07-09

## 2019-09-12 PROBLEM — E78.5 HYPERLIPIDEMIA, UNSPECIFIED: Chronic | Status: ACTIVE | Noted: 2019-07-09

## 2019-09-12 PROBLEM — G43.909 MIGRAINE, UNSPECIFIED, NOT INTRACTABLE, WITHOUT STATUS MIGRAINOSUS: Chronic | Status: ACTIVE | Noted: 2019-07-09

## 2019-09-12 PROBLEM — G43.909 MIGRAINE, UNSPECIFIED, NOT INTRACTABLE, WITHOUT STATUS MIGRAINOSUS: Chronic | Status: ACTIVE | Noted: 2019-07-20

## 2019-09-12 PROBLEM — I49.3 VENTRICULAR PREMATURE DEPOLARIZATION: Chronic | Status: ACTIVE | Noted: 2019-07-09

## 2019-09-12 PROCEDURE — 99215 OFFICE O/P EST HI 40 MIN: CPT

## 2019-09-12 RX ORDER — FENTANYL 25 UG/1
25 PATCH TRANSDERMAL
Refills: 0 | Status: ACTIVE | COMMUNITY

## 2019-09-12 RX ORDER — MECLIZINE HYDROCHLORIDE 25 MG/1
25 TABLET ORAL
Qty: 36 | Refills: 0 | Status: ACTIVE | COMMUNITY
Start: 2019-07-10

## 2019-09-12 RX ORDER — ALPRAZOLAM 0.25 MG/1
0.25 TABLET ORAL
Qty: 120 | Refills: 0 | Status: ACTIVE | COMMUNITY
Start: 2019-03-25

## 2019-09-12 RX ORDER — CYCLOBENZAPRINE HYDROCHLORIDE 5 MG/1
5 TABLET, FILM COATED ORAL
Qty: 30 | Refills: 0 | Status: ACTIVE | COMMUNITY
Start: 2018-11-09

## 2019-09-12 RX ORDER — OXYCODONE AND ACETAMINOPHEN 10; 325 MG/1; MG/1
10-325 TABLET ORAL
Qty: 120 | Refills: 0 | Status: ACTIVE | COMMUNITY
Start: 2019-05-10

## 2019-09-12 RX ORDER — FENTANYL 100 UG/1
100 PATCH TRANSDERMAL
Refills: 0 | Status: ACTIVE | COMMUNITY

## 2019-09-12 RX ORDER — FLUOROMETHOLONE 1 MG/ML
0.1 SOLUTION/ DROPS OPHTHALMIC
Qty: 10 | Refills: 0 | Status: ACTIVE | COMMUNITY
Start: 2019-03-29

## 2019-09-12 RX ORDER — METOPROLOL SUCCINATE 25 MG/1
25 TABLET, EXTENDED RELEASE ORAL
Qty: 30 | Refills: 0 | Status: ACTIVE | COMMUNITY
Start: 2018-11-13

## 2019-09-12 NOTE — PHYSICAL EXAM
[General Appearance - Alert] : alert [General Appearance - In No Acute Distress] : in no acute distress [Oriented To Time, Place, And Person] : oriented to person, place, and time [Impaired Insight] : insight and judgment were intact [Affect] : the affect was normal [Person] : oriented to person [Place] : oriented to place [Concentration Intact] : normal concentrating ability [Time] : oriented to time [Visual Intact] : visual attention was ~T not ~L decreased [Naming Objects] : no difficulty naming common objects [Writing A Sentence] : no difficulty writing a sentence [Repeating Phrases] : no difficulty repeating a phrase [Comprehension] : comprehension intact [Fluency] : fluency intact [Reading] : reading intact [Past History] : adequate knowledge of personal past history [Cranial Nerves Oculomotor (III)] : extraocular motion intact [Cranial Nerves Optic (II)] : visual acuity intact bilaterally,  visual fields full to confrontation, pupils equal round and reactive to light [Cranial Nerves Facial (VII)] : face symmetrical [Cranial Nerves Trigeminal (V)] : facial sensation intact symmetrically [Cranial Nerves Vestibulocochlear (VIII)] : hearing was intact bilaterally [Cranial Nerves Accessory (XI - Cranial And Spinal)] : head turning and shoulder shrug symmetric [Cranial Nerves Glossopharyngeal (IX)] : tongue and palate midline [Cranial Nerves Hypoglossal (XII)] : there was no tongue deviation with protrusion [Motor Strength] : muscle strength was normal in all four extremities [Sensation Tactile Decrease] : light touch was intact [No Muscle Atrophy] : normal bulk in all four extremities [Limited Balance] : the patient's balance was impaired [Past-pointing] : there was no past-pointing [Tremor] : no tremor present [2+] : Patella left 2+ [1+] : Ankle jerk left 1+ [Plantar Reflex Right Only] : normal on the right [Plantar Reflex Left Only] : normal on the left [FreeTextEntry8] : Mild unsteadiness [Sclera] : the sclera and conjunctiva were normal [PERRL With Normal Accommodation] : pupils were equal in size, round, reactive to light, with normal accommodation [Extraocular Movements] : extraocular movements were intact [Oropharynx] : the oropharynx was normal [Outer Ear] : the ears and nose were normal in appearance [Neck Appearance] : the appearance of the neck was normal [Neck Cervical Mass (___cm)] : no neck mass was observed [Thyroid Diffuse Enlargement] : the thyroid was not enlarged [Thyroid Nodule] : there were no palpable thyroid nodules [Jugular Venous Distention Increased] : there was no jugular-venous distention [Auscultation Breath Sounds / Voice Sounds] : lungs were clear to auscultation bilaterally [Heart Sounds] : normal S1 and S2 [Heart Rate And Rhythm] : heart rate was normal and rhythm regular [Murmurs] : no murmurs [Heart Sounds Gallop] : no gallops [Heart Sounds Pericardial Friction Rub] : no pericardial rub [Full Pulse] : the pedal pulses are present [Bowel Sounds] : normal bowel sounds [Edema] : there was no peripheral edema [Abdomen Tenderness] : non-tender [Abdomen Soft] : soft [No CVA Tenderness] : no ~M costovertebral angle tenderness [Abdomen Mass (___ Cm)] : no abdominal mass palpated [No Spinal Tenderness] : no spinal tenderness [Nail Clubbing] : no clubbing  or cyanosis of the fingernails [Motor Tone] : muscle strength and tone were normal [Musculoskeletal - Swelling] : no joint swelling seen [Skin Color & Pigmentation] : normal skin color and pigmentation [FreeTextEntry1] : mild unsteady [] : no rash [Skin Turgor] : normal skin turgor

## 2019-09-12 NOTE — HISTORY OF PRESENT ILLNESS
[FreeTextEntry1] : She is 59-year-old patient admitted to the hospital on 7/9/19 with sudden onset of acute onset of vertigo with violent sensation of vertigo with imbalance and unsteadiness started on 7/8/19 did not go to hospital until next day. Symptoms persisted until next day unable to get up and left leg gave way on heart with spinning sensation and went to [facility] next day and admitted for further evaluation workup.\par \par Seen by Dr. Nichols and evaluated head CT scan, CT angiogram, MRI of the brain did not reveal any acute pathology. Recommended followup evaluation. Patient had started vestibular therapy and symptoms persisting. No headaches except for history of chronic migraine headaches recently ocular migraines involving right side intermittently not associated with her vertigo.\par \par Also complaining about right hand tendinitis or pain started a few months ago. Admits that she is under stress recently and chronic low back pain for which she is under pain management care. Takes medications including Percocet and fentanyl patch.

## 2019-09-12 NOTE — DISCUSSION/SUMMARY
[FreeTextEntry1] : Patient with recent onset of acute vertigo with nonfocal examination except for mild instability on tandem walking benign positional vertigo or central vertigo Ruled out.\par \par Recommend patient did have ENT evaluation with ENG testing.\par \par Recommend continue vestibular therapy.\par \par Get reports from vestibular therapy .\par \par Imaging studies were negative.\par \par Get reports from cardiologist's office including Holter monitor reports.\par \par Discussed with the patient and patient education provided.\par \par Followup evaluation in 3-4 weeks.\par \par Evaluation for her right hand symptoms as patient has different problem with her hand. Might need orthopedic evaluation.\par \par Followup with you for other medical problems.

## 2019-09-12 NOTE — REVIEW OF SYSTEMS
[Poor Coordination] : poor coordination [Dizziness] : dizziness [Vertigo] : vertigo [Anxiety] : anxiety [Migraine Headache] : migraine headaches [Palpitations] : palpitations [Depression] : depression [Joint Pain] : joint pain [Negative] : Heme/Lymph [de-identified] : CH LBP\par Rt wrist pain [FreeTextEntry9] : Rt wrist pain

## 2019-09-12 NOTE — REASON FOR VISIT
[Post Hospitalization] : a post hospitalization visit [FreeTextEntry1] : Pt referred from  after seen  by DR. Nichols for acute onset of Vertigo with unsteady gait on 7/8/19

## 2019-09-12 NOTE — CONSULT LETTER
[Dear  ___] : Dear  [unfilled], [Consult Letter:] : I had the pleasure of evaluating your patient, [unfilled]. [Please see my note below.] : Please see my note below. [Sincerely,] : Sincerely, [Consult Closing:] : Thank you very much for allowing me to participate in the care of this patient.  If you have any questions, please do not hesitate to contact me. [DrJere  ___] : Dr. AHUJA

## 2019-10-18 ENCOUNTER — APPOINTMENT (OUTPATIENT)
Dept: NEUROLOGY | Facility: CLINIC | Age: 59
End: 2019-10-18

## 2020-01-03 NOTE — ED ADULT NURSE NOTE - ISOLATION TYPE:
None Benzoyl Peroxide Counseling: Patient counseled that medicine may cause skin irritation and bleach clothing.  In the event of skin irritation, the patient was advised to reduce the amount of the drug applied or use it less frequently.   The patient verbalized understanding of the proper use and possible adverse effects of benzoyl peroxide.  All of the patient's questions and concerns were addressed.

## 2020-04-28 NOTE — H&P ADULT - ATTENDING COMMENTS
16 minutes spent discussing advanced care planning with patient and  at bedside. Pt accepts CPR/intubation if medically necessary.  to make medical decisions on her behalf should she become unable. Pt is full code at this time.
0

## 2023-08-26 ENCOUNTER — INPATIENT (INPATIENT)
Facility: HOSPITAL | Age: 63
LOS: 1 days | Discharge: HOME | DRG: 446 | End: 2023-08-28
Attending: STUDENT IN AN ORGANIZED HEALTH CARE EDUCATION/TRAINING PROGRAM | Admitting: STUDENT IN AN ORGANIZED HEALTH CARE EDUCATION/TRAINING PROGRAM
Payer: COMMERCIAL

## 2023-08-26 VITALS
TEMPERATURE: 98 F | RESPIRATION RATE: 18 BRPM | OXYGEN SATURATION: 97 % | DIASTOLIC BLOOD PRESSURE: 84 MMHG | SYSTOLIC BLOOD PRESSURE: 154 MMHG | HEART RATE: 80 BPM

## 2023-08-26 DIAGNOSIS — Z98.890 OTHER SPECIFIED POSTPROCEDURAL STATES: Chronic | ICD-10-CM

## 2023-08-26 LAB
ALBUMIN SERPL ELPH-MCNC: 4.7 G/DL — SIGNIFICANT CHANGE UP (ref 3.3–5)
ALP SERPL-CCNC: 77 U/L — SIGNIFICANT CHANGE UP (ref 40–120)
ALT FLD-CCNC: 25 U/L — SIGNIFICANT CHANGE UP (ref 12–78)
ANION GAP SERPL CALC-SCNC: 1 MMOL/L — LOW (ref 5–17)
APPEARANCE UR: CLEAR — SIGNIFICANT CHANGE UP
AST SERPL-CCNC: 15 U/L — SIGNIFICANT CHANGE UP (ref 15–37)
BASE EXCESS BLDV CALC-SCNC: 3.2 MMOL/L — HIGH (ref -2–3)
BASOPHILS # BLD AUTO: 0.05 K/UL — SIGNIFICANT CHANGE UP (ref 0–0.2)
BASOPHILS NFR BLD AUTO: 0.8 % — SIGNIFICANT CHANGE UP (ref 0–2)
BILIRUB SERPL-MCNC: 0.5 MG/DL — SIGNIFICANT CHANGE UP (ref 0.2–1.2)
BILIRUB UR-MCNC: NEGATIVE — SIGNIFICANT CHANGE UP
BUN SERPL-MCNC: 11 MG/DL — SIGNIFICANT CHANGE UP (ref 7–23)
CALCIUM SERPL-MCNC: 9.7 MG/DL — SIGNIFICANT CHANGE UP (ref 8.5–10.1)
CHLORIDE SERPL-SCNC: 109 MMOL/L — HIGH (ref 96–108)
CO2 SERPL-SCNC: 29 MMOL/L — SIGNIFICANT CHANGE UP (ref 22–31)
COLOR SPEC: YELLOW — SIGNIFICANT CHANGE UP
CREAT SERPL-MCNC: 0.97 MG/DL — SIGNIFICANT CHANGE UP (ref 0.5–1.3)
D DIMER BLD IA.RAPID-MCNC: <150 NG/ML DDU — SIGNIFICANT CHANGE UP
DIFF PNL FLD: NEGATIVE — SIGNIFICANT CHANGE UP
EGFR: 66 ML/MIN/1.73M2 — SIGNIFICANT CHANGE UP
EOSINOPHIL # BLD AUTO: 0.09 K/UL — SIGNIFICANT CHANGE UP (ref 0–0.5)
EOSINOPHIL NFR BLD AUTO: 1.5 % — SIGNIFICANT CHANGE UP (ref 0–6)
GLUCOSE SERPL-MCNC: 129 MG/DL — HIGH (ref 70–99)
GLUCOSE UR QL: NEGATIVE — SIGNIFICANT CHANGE UP
HCO3 BLDV-SCNC: 31 MMOL/L — HIGH (ref 22–29)
HCT VFR BLD CALC: 43.1 % — SIGNIFICANT CHANGE UP (ref 34.5–45)
HGB BLD-MCNC: 14.5 G/DL — SIGNIFICANT CHANGE UP (ref 11.5–15.5)
IMM GRANULOCYTES NFR BLD AUTO: 0.2 % — SIGNIFICANT CHANGE UP (ref 0–0.9)
KETONES UR-MCNC: NEGATIVE — SIGNIFICANT CHANGE UP
LEUKOCYTE ESTERASE UR-ACNC: NEGATIVE — SIGNIFICANT CHANGE UP
LIDOCAIN IGE QN: 45 U/L — SIGNIFICANT CHANGE UP (ref 13–75)
LYMPHOCYTES # BLD AUTO: 2.14 K/UL — SIGNIFICANT CHANGE UP (ref 1–3.3)
LYMPHOCYTES # BLD AUTO: 35.5 % — SIGNIFICANT CHANGE UP (ref 13–44)
MAGNESIUM SERPL-MCNC: 2.4 MG/DL — SIGNIFICANT CHANGE UP (ref 1.6–2.6)
MCHC RBC-ENTMCNC: 30.7 PG — SIGNIFICANT CHANGE UP (ref 27–34)
MCHC RBC-ENTMCNC: 33.6 GM/DL — SIGNIFICANT CHANGE UP (ref 32–36)
MCV RBC AUTO: 91.3 FL — SIGNIFICANT CHANGE UP (ref 80–100)
MONOCYTES # BLD AUTO: 0.35 K/UL — SIGNIFICANT CHANGE UP (ref 0–0.9)
MONOCYTES NFR BLD AUTO: 5.8 % — SIGNIFICANT CHANGE UP (ref 2–14)
NEUTROPHILS # BLD AUTO: 3.38 K/UL — SIGNIFICANT CHANGE UP (ref 1.8–7.4)
NEUTROPHILS NFR BLD AUTO: 56.2 % — SIGNIFICANT CHANGE UP (ref 43–77)
NITRITE UR-MCNC: NEGATIVE — SIGNIFICANT CHANGE UP
NT-PROBNP SERPL-SCNC: 59 PG/ML — SIGNIFICANT CHANGE UP (ref 0–125)
PCO2 BLDV: 62 MMHG — HIGH (ref 39–42)
PH BLDV: 7.31 — LOW (ref 7.32–7.43)
PH UR: 5 — SIGNIFICANT CHANGE UP (ref 5–8)
PLATELET # BLD AUTO: 271 K/UL — SIGNIFICANT CHANGE UP (ref 150–400)
PO2 BLDV: 50 MMHG — HIGH (ref 25–45)
POTASSIUM SERPL-MCNC: 3.9 MMOL/L — SIGNIFICANT CHANGE UP (ref 3.5–5.3)
POTASSIUM SERPL-SCNC: 3.9 MMOL/L — SIGNIFICANT CHANGE UP (ref 3.5–5.3)
PROT SERPL-MCNC: 8.5 GM/DL — HIGH (ref 6–8.3)
PROT UR-MCNC: NEGATIVE — SIGNIFICANT CHANGE UP
RBC # BLD: 4.72 M/UL — SIGNIFICANT CHANGE UP (ref 3.8–5.2)
RBC # FLD: 12.2 % — SIGNIFICANT CHANGE UP (ref 10.3–14.5)
SAO2 % BLDV: 79 % — SIGNIFICANT CHANGE UP (ref 67–88)
SODIUM SERPL-SCNC: 139 MMOL/L — SIGNIFICANT CHANGE UP (ref 135–145)
SP GR SPEC: 1.01 — SIGNIFICANT CHANGE UP (ref 1.01–1.02)
TROPONIN I, HIGH SENSITIVITY RESULT: 5.93 NG/L — SIGNIFICANT CHANGE UP
TROPONIN I, HIGH SENSITIVITY RESULT: <3 NG/L — SIGNIFICANT CHANGE UP
UROBILINOGEN FLD QL: NEGATIVE — SIGNIFICANT CHANGE UP
WBC # BLD: 6.02 K/UL — SIGNIFICANT CHANGE UP (ref 3.8–10.5)
WBC # FLD AUTO: 6.02 K/UL — SIGNIFICANT CHANGE UP (ref 3.8–10.5)

## 2023-08-26 PROCEDURE — 71045 X-RAY EXAM CHEST 1 VIEW: CPT | Mod: 26

## 2023-08-26 PROCEDURE — 70450 CT HEAD/BRAIN W/O DYE: CPT | Mod: 26,MD

## 2023-08-26 PROCEDURE — 74177 CT ABD & PELVIS W/CONTRAST: CPT | Mod: 26,MD

## 2023-08-26 PROCEDURE — 80053 COMPREHEN METABOLIC PANEL: CPT

## 2023-08-26 PROCEDURE — 76705 ECHO EXAM OF ABDOMEN: CPT | Mod: 26

## 2023-08-26 PROCEDURE — 36415 COLL VENOUS BLD VENIPUNCTURE: CPT

## 2023-08-26 PROCEDURE — 99285 EMERGENCY DEPT VISIT HI MDM: CPT

## 2023-08-26 PROCEDURE — 85027 COMPLETE CBC AUTOMATED: CPT

## 2023-08-26 PROCEDURE — C9113: CPT

## 2023-08-26 RX ORDER — ACETAMINOPHEN 500 MG
1000 TABLET ORAL ONCE
Refills: 0 | Status: COMPLETED | OUTPATIENT
Start: 2023-08-26 | End: 2023-08-26

## 2023-08-26 RX ORDER — METOPROLOL TARTRATE 50 MG
25 TABLET ORAL DAILY
Refills: 0 | Status: DISCONTINUED | OUTPATIENT
Start: 2023-08-26 | End: 2023-08-28

## 2023-08-26 RX ORDER — SODIUM CHLORIDE 9 MG/ML
1000 INJECTION INTRAMUSCULAR; INTRAVENOUS; SUBCUTANEOUS ONCE
Refills: 0 | Status: COMPLETED | OUTPATIENT
Start: 2023-08-26 | End: 2023-08-26

## 2023-08-26 RX ORDER — ONDANSETRON 8 MG/1
4 TABLET, FILM COATED ORAL ONCE
Refills: 0 | Status: COMPLETED | OUTPATIENT
Start: 2023-08-26 | End: 2023-08-26

## 2023-08-26 RX ORDER — SENNA PLUS 8.6 MG/1
2 TABLET ORAL AT BEDTIME
Refills: 0 | Status: DISCONTINUED | OUTPATIENT
Start: 2023-08-26 | End: 2023-08-28

## 2023-08-26 RX ORDER — CYCLOBENZAPRINE HYDROCHLORIDE 10 MG/1
5 TABLET, FILM COATED ORAL AT BEDTIME
Refills: 0 | Status: DISCONTINUED | OUTPATIENT
Start: 2023-08-26 | End: 2023-08-28

## 2023-08-26 RX ORDER — MECLIZINE HCL 12.5 MG
50 TABLET ORAL ONCE
Refills: 0 | Status: COMPLETED | OUTPATIENT
Start: 2023-08-26 | End: 2023-08-26

## 2023-08-26 RX ORDER — POLYETHYLENE GLYCOL 3350 17 G/17G
17 POWDER, FOR SOLUTION ORAL DAILY
Refills: 0 | Status: DISCONTINUED | OUTPATIENT
Start: 2023-08-26 | End: 2023-08-28

## 2023-08-26 RX ORDER — MORPHINE SULFATE 50 MG/1
2 CAPSULE, EXTENDED RELEASE ORAL ONCE
Refills: 0 | Status: DISCONTINUED | OUTPATIENT
Start: 2023-08-26 | End: 2023-08-26

## 2023-08-26 RX ORDER — LANOLIN ALCOHOL/MO/W.PET/CERES
3 CREAM (GRAM) TOPICAL AT BEDTIME
Refills: 0 | Status: DISCONTINUED | OUTPATIENT
Start: 2023-08-26 | End: 2023-08-28

## 2023-08-26 RX ORDER — NALOXONE HYDROCHLORIDE 4 MG/.1ML
0.4 SPRAY NASAL ONCE
Refills: 0 | Status: DISCONTINUED | OUTPATIENT
Start: 2023-08-26 | End: 2023-08-28

## 2023-08-26 RX ORDER — MORPHINE SULFATE 50 MG/1
2 CAPSULE, EXTENDED RELEASE ORAL EVERY 4 HOURS
Refills: 0 | Status: DISCONTINUED | OUTPATIENT
Start: 2023-08-26 | End: 2023-08-28

## 2023-08-26 RX ORDER — SODIUM CHLORIDE 9 MG/ML
1000 INJECTION INTRAMUSCULAR; INTRAVENOUS; SUBCUTANEOUS
Refills: 0 | Status: DISCONTINUED | OUTPATIENT
Start: 2023-08-26 | End: 2023-08-28

## 2023-08-26 RX ORDER — METOCLOPRAMIDE HCL 10 MG
10 TABLET ORAL ONCE
Refills: 0 | Status: COMPLETED | OUTPATIENT
Start: 2023-08-26 | End: 2023-08-26

## 2023-08-26 RX ORDER — MORPHINE SULFATE 50 MG/1
4 CAPSULE, EXTENDED RELEASE ORAL EVERY 4 HOURS
Refills: 0 | Status: DISCONTINUED | OUTPATIENT
Start: 2023-08-26 | End: 2023-08-28

## 2023-08-26 RX ORDER — METOCLOPRAMIDE HCL 10 MG
10 TABLET ORAL ONCE
Refills: 0 | Status: DISCONTINUED | OUTPATIENT
Start: 2023-08-26 | End: 2023-08-26

## 2023-08-26 RX ORDER — ONDANSETRON 8 MG/1
4 TABLET, FILM COATED ORAL EVERY 8 HOURS
Refills: 0 | Status: DISCONTINUED | OUTPATIENT
Start: 2023-08-26 | End: 2023-08-28

## 2023-08-26 RX ORDER — PANTOPRAZOLE SODIUM 20 MG/1
40 TABLET, DELAYED RELEASE ORAL
Refills: 0 | Status: DISCONTINUED | OUTPATIENT
Start: 2023-08-26 | End: 2023-08-28

## 2023-08-26 RX ADMIN — ONDANSETRON 4 MILLIGRAM(S): 8 TABLET, FILM COATED ORAL at 19:07

## 2023-08-26 RX ADMIN — Medication 10 MILLIGRAM(S): at 21:49

## 2023-08-26 RX ADMIN — PANTOPRAZOLE SODIUM 40 MILLIGRAM(S): 20 TABLET, DELAYED RELEASE ORAL at 23:57

## 2023-08-26 RX ADMIN — SODIUM CHLORIDE 1000 MILLILITER(S): 9 INJECTION INTRAMUSCULAR; INTRAVENOUS; SUBCUTANEOUS at 21:49

## 2023-08-26 RX ADMIN — SODIUM CHLORIDE 1000 MILLILITER(S): 9 INJECTION INTRAMUSCULAR; INTRAVENOUS; SUBCUTANEOUS at 19:07

## 2023-08-26 RX ADMIN — Medication 50 MILLIGRAM(S): at 21:48

## 2023-08-26 RX ADMIN — MORPHINE SULFATE 2 MILLIGRAM(S): 50 CAPSULE, EXTENDED RELEASE ORAL at 19:07

## 2023-08-26 RX ADMIN — SODIUM CHLORIDE 125 MILLILITER(S): 9 INJECTION INTRAMUSCULAR; INTRAVENOUS; SUBCUTANEOUS at 23:57

## 2023-08-26 RX ADMIN — SENNA PLUS 2 TABLET(S): 8.6 TABLET ORAL at 23:57

## 2023-08-26 RX ADMIN — Medication 400 MILLIGRAM(S): at 22:21

## 2023-08-26 NOTE — ED ADULT TRIAGE NOTE - CHIEF COMPLAINT QUOTE
pt ambulatory to ED c/o HTN. pt endorses epigastric pain since wednesday, seen in UC with HTN. endorses "not feeling right", denies dizziness/SOB/chest pain. hx of TIA

## 2023-08-26 NOTE — ED ADULT NURSE NOTE - OBJECTIVE STATEMENT
PT presents to ED c/o epigastric pain and HTN since wednesday. Was seen at urgent care and sent to ED for follow up. PT denies nvd, reports  hx of tia. States she "doesn't feel right". PT skin color appropriate for race, respirations even and unlabored. ED workup in progress, will continue to monitor. Cristhian Williamson RN

## 2023-08-26 NOTE — ED STATDOCS - OBJECTIVE STATEMENT
62 yo female with a PMHx of TIA, transient vtach on metoprolol, previous central vertigo, chronic back pain on opiates, thyroid abnormalities, HLD, migraines, and fibromyalgia presents to the ED c/o dizziness and disequilibrium. Pt endorses symptoms since Wednesday, pt endorsing gradually worsening discomfort in the substernal area. Pt is conscious of ingestion. Pt reporting dark stool. 64 yo female with a PMHx of TIA, transient vtach on metoprolol, previous central vertigo, chronic back pain on opiates, thyroid abnormalities, HLD, migraines, and fibromyalgia presents to the ED c/o dizziness and disequilibrium. Pt endorses symptoms since Wednesday, pt endorsing gradually worsening discomfort in the substernal area. Pt is conscious of ingestion. Pt reporting dark stool. No fever or chills. No recent trauma.

## 2023-08-26 NOTE — H&P ADULT - ASSESSMENT
63F w/PMH HLD, FM, chronic pain, presents c/o sudden onset of nausea and epigastric abd pain 3 days ago.   In ED, labs wnl, lipase neg, trop neg, CT head neg (for initial dizziness, although she's no longer dizzy),  given meclizine, IV tylenol, ivf.  CT a/p (+con) Central intrahepatic biliary and common bile ductal dilatation to the level of the ampulla. Pancreatic ductal dilatation.  Small density at the proximal to the lumen above the level of the ampulla, which may be due to ingested content versus focal lesion. Recommend correlation and follow-up (MRCP/MR and direct visualization) to exclude underlying biliary/ampullary pathology.     US RUQ: Intrahepatic and extrahepatic biliary ductal dilatation with the common bile duct measuring up to 13 mm as well as mild dilatation of the main pancreatic duct and gallbladder distention. Findings are concerning for obstruction at the level of the ampulla, recommend further evaluation with MRCP.    #Epigastic pain  #Nausea  #Biliary duct dilatation  #ampulla lesion, possibly  - Check MRCP  - GI cs   - PPI IV 40mg BID  - ivf  - diet per GI if no scoping planned    #elevated blood pressure, no h/o HTN  - suspect d/t pain  - monitor, treat as indicated    #Chronic pain  - c/w duragesic patch 100mcg Q48H    #PPX- ambulate

## 2023-08-26 NOTE — ED STATDOCS - CLINICAL SUMMARY MEDICAL DECISION MAKING FREE TEXT BOX
Pt with persistent dizziness, vertigo / disequilibrium, and chest discomfort. Will do labs, ct, and admission to r/o post infarct vs VBI. Pt with persistent dizziness, vertigo / disequilibrium, and chest discomfort. Will do labs, ct, and admission to r/o post infarct vs VBI.    dizziness, disequilibrium, r/o TIA/CVA or ACS, labs, imaging, admission for MR, potential Neuro cs.

## 2023-08-26 NOTE — H&P ADULT - HISTORY OF PRESENT ILLNESS
HPI:          CT a/p (+con) Central intrahepatic biliary and common bile ductal dilatation to the level of the ampulla. Pancreatic ductal dilatation.  Small density at the proximal to the lumenabove the level of the ampulla, which may be due to ingested content versus focal lesion. Recommend correlation and follow-up (MRCP/MR and direct visualization) to exclude underlying biliary/ampullary pathology.   CT head: neg/   US RUQ: Intrahepatic and extrahepatic biliary ductal dilatation with the common bile duct measuring up to 13 mm as well as mild dilatation of the main pancreatic duct and gallbladder distention. Findings are concerning for obstruction at the level of the ampulla, recommend further evaluation with MRCP.    PAST MEDICAL & SURGICAL HISTORY:  Fibromyalgia  HLD (hyperlipidemia)  Migraines  Chronic back pain  Premature atrial complex  Ventricular premature beats  H/O laminectomy  H/O discectomy      Review of Systems:   CONSTITUTIONAL: No fever.  EYES: No eye pain or discharge.  ENMT:  No sinus or throat pain  NECK: No pain or stiffness  RESPIRATORY: No cough, wheezing, chills or hemoptysis; No shortness of breath  CARDIOVASCULAR: No chest pain, palpitations, dizziness, or leg swelling  GASTROINTESTINAL: See HPI   GENITOURINARY: No dysuria or incontinence  NEUROLOGICAL: No headaches, memory loss, loss of strength, numbness, or tremors  SKIN: No rashes.  MUSCULOSKELETAL: No joint pain or swelling; No muscle, back, or extremity pain  PSYCHIATRIC: No depression, anxiety, mood swings, or difficulty sleeping    Social History:   lives w /  denies smoking, occ etoh    FAMILY HISTORY:  Family history of Parkinson's diseasedeceased, father/grandfather  Family history of neuromuscular dysfunction in mother       MEDICATIONS  (STANDING):  fentaNYL   Patch 100 MICROgram(s)/Hr. 1 Patch Transdermal every 48 hours  metoprolol succinate ER 25 milliGRAM(s) Oral daily  naloxone Injectable 0.4 milliGRAM(s) IV Push once  pantoprazole  Injectable 40 milliGRAM(s) IV Push two times a day  polyethylene glycol 3350 17 Gram(s) Oral daily  senna 2 Tablet(s) Oral at bedtime  sodium chloride 0.9%. 1000 milliLiter(s) (125 mL/Hr) IV Continuous <Continuous>    MEDICATIONS  (PRN):  aluminum hydroxide/magnesium hydroxide/simethicone Suspension 30 milliLiter(s) Oral every 4 hours PRN Dyspepsia  bisacodyl 5 milliGRAM(s) Oral daily PRN Constipation  cyclobenzaprine 5 milliGRAM(s) Oral at bedtime PRN Muscle Spasm  melatonin 3 milliGRAM(s) Oral at bedtime PRN Insomnia  morphine  - Injectable 2 milliGRAM(s) IV Push every 4 hours PRN Moderate Pain (4 - 6)  morphine  - Injectable 4 milliGRAM(s) IV Push every 4 hours PRN Severe Pain (7 - 10)  ondansetron Injectable 4 milliGRAM(s) IV Push every 8 hours PRN Nausea and/or Vomiting      PHYSICAL EXAM:  Vital Signs Last 24 Hrs  T(C): 36.3 (26 Aug 2023 23:12), Max: 36.8 (26 Aug 2023 18:25)  T(F): 97.4 (26 Aug 2023 23:12), Max: 98.3 (26 Aug 2023 18:25)  HR: 69 (26 Aug 2023 23:12) (69 - 80)  BP: 169/87 (26 Aug 2023 23:12) (154/84 - 169/87)  BP(mean): 102 (26 Aug 2023 16:18) (102 - 102)  RR: 19 (26 Aug 2023 23:12) (18 - 19)  SpO2: 100% (26 Aug 2023 23:12) (97% - 100%)    Parameters below as of 26 Aug 2023 23:12  Patient On (Oxygen Delivery Method): room air  GENERAL: NAD,   HEAD:  Atraumatic, Normocephalic  EYES: EOMI, PERRLA, conjunctiva and sclera clear  ENT: normal hearing, no nasal discharge, throat clear, dentition normal  NECK: Supple, No JVD, no LAD, no thyromegaly   CHEST/LUNG: Clear to auscultation bilaterally; No wheeze, respirations unlabored  HEART: Regular rate and rhythm; No murmurs, rubs, or gallops  ABDOMEN: Soft, ++Epigastric TTP,  Nondistended; Bowel sounds present, no HSM  EXTREMITIES:  2+ Peripheral Pulses, No clubbing, cyanosis, or edema  PSYCH: AAOx3, normal behavior  NEUROLOGY: non-focal, sensory and cn 2-12 intact, speech/language intact  SKIN: No visible rashes or lesions    LABS:                        14.5   6.02  )-----------( 271      ( 26 Aug 2023 18:34 )             43.1         139  |  109<H>  |  11  ----------------------------<  129<H>  3.9   |  29  |  0.97    Ca    9.7      26 Aug 2023 18:34  Mg     2.4         TPro  8.5<H>  /  Alb  4.7  /  TBili  0.5  /  DBili  x   /  AST  15  /  ALT  25  /  AlkPhos  77            Urinalysis Basic - ( 26 Aug 2023 18:34 )    Color: Yellow / Appearance: Clear / S.010 / pH: x  Gluc: 129 mg/dL / Ketone: Negative  / Bili: Negative / Urobili: Negative   Blood: x / Protein: Negative / Nitrite: Negative   Leuk Esterase: Negative / RBC: x / WBC x   Sq Epi: x / Non Sq Epi: x / Bacteria: x        RADIOLOGY & ADDITIONAL TESTS:    Imaging Personally Reviewed:  CT a/p (+con) Central intrahepatic biliary and common bile ductal dilatation to the level of the ampulla. Pancreatic ductal dilatation.  Small density at the proximal to the lumenabove the level of the ampulla, which may be due to ingested content versus focal lesion. Recommend correlation and follow-up (MRCP/MR and direct visualization) to exclude underlying biliary/ampullary pathology.    CT head: neg    US RUQ: Intrahepatic and extrahepatic biliary ductal dilatation with the common bile duct measuring up to 13 mm as well as mild dilatation of the main pancreatic duct and gallbladder distention. Findings are concerning for obstruction at the level of the ampulla, recommend further evaluation with MRCP.    EKG Personally Reviewed:  n/a    Consultant(s) Notes Reviewed:      Care Discussed with Consultants/Other Providers:   HPI:  63F w/PMH HLD, FM, presents c/o sudden onset of nausea and epigastric abd pain 3 days ago. Pt endorses that while sitting on her couch, she suddenly had nausea associated w/ severe diaphoresis, dizziness. followed by intractable vomiting. The next day she no longer had any vomiting or dizziness, but continued to have constant nausea w/ epigastric pain. She was concerned that she may have an ulcer as she did 30 yrs ago, although she does not recall her symptoms from that time.  Current pain is relieved when she eats something but then followed by severe pain shortly afterward.  Pain is severe, 8/10, nonradiating, no exacerbating factors, seems to wax and wane but overall constant. Over past few days, she's eaten soup and grilled cheese sandwiches.      In ED, labs wnl, lipase neg, trop neg, CT head neg (for initial dizziness, although she's no longer dizzy),  given meclizine, IV tylenol, ivf.  CT a/p (+con) Central intrahepatic biliary and common bile ductal dilatation to the level of the ampulla. Pancreatic ductal dilatation.  Small density at the proximal to the lumenabove the level of the ampulla, which may be due to ingested content versus focal lesion. Recommend correlation and follow-up (MRCP/MR and direct visualization) to exclude underlying biliary/ampullary pathology.     US RUQ: Intrahepatic and extrahepatic biliary ductal dilatation with the common bile duct measuring up to 13 mm as well as mild dilatation of the main pancreatic duct and gallbladder distention. Findings are concerning for obstruction at the level of the ampulla, recommend further evaluation with MRCP.    PAST MEDICAL & SURGICAL HISTORY:  Fibromyalgia  HLD (hyperlipidemia)  Migraines  Chronic back pain  Premature atrial complex  Ventricular premature beats  H/O laminectomy  H/O discectomy      Review of Systems:   CONSTITUTIONAL: No fever.  EYES: No eye pain or discharge.  ENMT:  No sinus or throat pain  NECK: No pain or stiffness  RESPIRATORY: No cough, wheezing, chills or hemoptysis; No shortness of breath  CARDIOVASCULAR: No chest pain, palpitations, dizziness, or leg swelling  GASTROINTESTINAL: See HPI   GENITOURINARY: No dysuria or incontinence  NEUROLOGICAL: No headaches, memory loss, loss of strength, numbness, or tremors  SKIN: No rashes.  MUSCULOSKELETAL: No joint pain or swelling; No muscle, back, or extremity pain  PSYCHIATRIC: No depression, anxiety, mood swings, or difficulty sleeping    Social History:   lives w /  denies smoking, occ etoh    FAMILY HISTORY:  Family history of Parkinson's diseasedeceased, father/grandfather  Family history of neuromuscular dysfunction in mother       MEDICATIONS  (STANDING):  fentaNYL   Patch 100 MICROgram(s)/Hr. 1 Patch Transdermal every 48 hours  metoprolol succinate ER 25 milliGRAM(s) Oral daily  naloxone Injectable 0.4 milliGRAM(s) IV Push once  pantoprazole  Injectable 40 milliGRAM(s) IV Push two times a day  polyethylene glycol 3350 17 Gram(s) Oral daily  senna 2 Tablet(s) Oral at bedtime  sodium chloride 0.9%. 1000 milliLiter(s) (125 mL/Hr) IV Continuous <Continuous>    MEDICATIONS  (PRN):  aluminum hydroxide/magnesium hydroxide/simethicone Suspension 30 milliLiter(s) Oral every 4 hours PRN Dyspepsia  bisacodyl 5 milliGRAM(s) Oral daily PRN Constipation  cyclobenzaprine 5 milliGRAM(s) Oral at bedtime PRN Muscle Spasm  melatonin 3 milliGRAM(s) Oral at bedtime PRN Insomnia  morphine  - Injectable 2 milliGRAM(s) IV Push every 4 hours PRN Moderate Pain (4 - 6)  morphine  - Injectable 4 milliGRAM(s) IV Push every 4 hours PRN Severe Pain (7 - 10)  ondansetron Injectable 4 milliGRAM(s) IV Push every 8 hours PRN Nausea and/or Vomiting      PHYSICAL EXAM:  Vital Signs Last 24 Hrs  T(C): 36.3 (26 Aug 2023 23:12), Max: 36.8 (26 Aug 2023 18:25)  T(F): 97.4 (26 Aug 2023 23:12), Max: 98.3 (26 Aug 2023 18:25)  HR: 69 (26 Aug 2023 23:12) (69 - 80)  BP: 169/87 (26 Aug 2023 23:12) (154/84 - 169/87)  BP(mean): 102 (26 Aug 2023 16:18) (102 - 102)  RR: 19 (26 Aug 2023 23:12) (18 - 19)  SpO2: 100% (26 Aug 2023 23:12) (97% - 100%)    Parameters below as of 26 Aug 2023 23:12  Patient On (Oxygen Delivery Method): room air  GENERAL: NAD,   HEAD:  Atraumatic, Normocephalic  EYES: EOMI, PERRLA, conjunctiva and sclera clear  ENT: normal hearing, no nasal discharge, throat clear, dentition normal  NECK: Supple, No JVD, no LAD, no thyromegaly   CHEST/LUNG: Clear to auscultation bilaterally; No wheeze, respirations unlabored  HEART: Regular rate and rhythm; No murmurs, rubs, or gallops  ABDOMEN: Soft, ++Epigastric TTP,  Nondistended; Bowel sounds present, no HSM  EXTREMITIES:  2+ Peripheral Pulses, No clubbing, cyanosis, or edema  PSYCH: AAOx3, normal behavior  NEUROLOGY: non-focal, sensory and cn 2-12 intact, speech/language intact  SKIN: No visible rashes or lesions    LABS:                        14.5   6.02  )-----------( 271      ( 26 Aug 2023 18:34 )             43.1     08    139  |  109<H>  |  11  ----------------------------<  129<H>  3.9   |  29  |  0.97    Ca    9.7      26 Aug 2023 18:34  Mg     2.4         TPro  8.5<H>  /  Alb  4.7  /  TBili  0.5  /  DBili  x   /  AST  15  /  ALT  25  /  AlkPhos  77            Urinalysis Basic - ( 26 Aug 2023 18:34 )    Color: Yellow / Appearance: Clear / S.010 / pH: x  Gluc: 129 mg/dL / Ketone: Negative  / Bili: Negative / Urobili: Negative   Blood: x / Protein: Negative / Nitrite: Negative   Leuk Esterase: Negative / RBC: x / WBC x   Sq Epi: x / Non Sq Epi: x / Bacteria: x        RADIOLOGY & ADDITIONAL TESTS:    Imaging Personally Reviewed:  CT a/p (+con) Central intrahepatic biliary and common bile ductal dilatation to the level of the ampulla. Pancreatic ductal dilatation.  Small density at the proximal to the lumenabove the level of the ampulla, which may be due to ingested content versus focal lesion. Recommend correlation and follow-up (MRCP/MR and direct visualization) to exclude underlying biliary/ampullary pathology.    CT head: neg    US RUQ: Intrahepatic and extrahepatic biliary ductal dilatation with the common bile duct measuring up to 13 mm as well as mild dilatation of the main pancreatic duct and gallbladder distention. Findings are concerning for obstruction at the level of the ampulla, recommend further evaluation with MRCP.    EKG Personally Reviewed:  n/a    Consultant(s) Notes Reviewed:      Care Discussed with Consultants/Other Providers:

## 2023-08-26 NOTE — ED STATDOCS - ATTENDING CONTRIBUTION TO CARE
I Guanako Anders MD saw and examined the patient. Resident physician saw and examined the patient under my supervision and I was involved in key steps in care of this patient. I discussed the care of the patient with resident and agree with resident's plan, assessment and care of the patient while in the ED..

## 2023-08-26 NOTE — ED STATDOCS - NSICDXFAMILYHX_GEN_ALL_CORE_FT
FAMILY HISTORY:  Family history of neuromuscular dysfunction, in mother   Family history of Parkinson's disease, , father/grandfather

## 2023-08-26 NOTE — ED STATDOCS - CROS ED NEURO POS
dizziness, disequilibrium/HEADACHE/DIFFICULTY WALKING / IMBALANCE/SEIZURES/CHANGE IN LEVEL OF CONSCIOUSNESS

## 2023-08-26 NOTE — ED STATDOCS - BIRTH SEX
Attempted to reach patient for:  Routine Follow Up.  Week 2 of 4 - post hospital stay calls.    Outcome:  Unable to reach patient, writer left message asking for a call back.    Next Follow Up:  Next Week.    Female

## 2023-08-26 NOTE — ED STATDOCS - DIFFERENTIAL DIAGNOSIS
Differential Diagnosis dizziness, disequilibrium, r/o TIA/CVA or ACS, labs, imaging, admission for MR, potential Neuro cs.

## 2023-08-27 LAB
ALBUMIN SERPL ELPH-MCNC: 4 G/DL — SIGNIFICANT CHANGE UP (ref 3.3–5)
ALP SERPL-CCNC: 67 U/L — SIGNIFICANT CHANGE UP (ref 40–120)
ALT FLD-CCNC: 17 U/L — SIGNIFICANT CHANGE UP (ref 12–78)
ANION GAP SERPL CALC-SCNC: 4 MMOL/L — LOW (ref 5–17)
AST SERPL-CCNC: 15 U/L — SIGNIFICANT CHANGE UP (ref 15–37)
BILIRUB SERPL-MCNC: 0.8 MG/DL — SIGNIFICANT CHANGE UP (ref 0.2–1.2)
BUN SERPL-MCNC: 7 MG/DL — SIGNIFICANT CHANGE UP (ref 7–23)
CALCIUM SERPL-MCNC: 9.2 MG/DL — SIGNIFICANT CHANGE UP (ref 8.5–10.1)
CHLORIDE SERPL-SCNC: 112 MMOL/L — HIGH (ref 96–108)
CO2 SERPL-SCNC: 25 MMOL/L — SIGNIFICANT CHANGE UP (ref 22–31)
CREAT SERPL-MCNC: 0.77 MG/DL — SIGNIFICANT CHANGE UP (ref 0.5–1.3)
EGFR: 88 ML/MIN/1.73M2 — SIGNIFICANT CHANGE UP
GLUCOSE SERPL-MCNC: 96 MG/DL — SIGNIFICANT CHANGE UP (ref 70–99)
HCT VFR BLD CALC: 38.8 % — SIGNIFICANT CHANGE UP (ref 34.5–45)
HGB BLD-MCNC: 13.2 G/DL — SIGNIFICANT CHANGE UP (ref 11.5–15.5)
MCHC RBC-ENTMCNC: 30.9 PG — SIGNIFICANT CHANGE UP (ref 27–34)
MCHC RBC-ENTMCNC: 34 GM/DL — SIGNIFICANT CHANGE UP (ref 32–36)
MCV RBC AUTO: 90.9 FL — SIGNIFICANT CHANGE UP (ref 80–100)
PLATELET # BLD AUTO: 223 K/UL — SIGNIFICANT CHANGE UP (ref 150–400)
POTASSIUM SERPL-MCNC: 3.7 MMOL/L — SIGNIFICANT CHANGE UP (ref 3.5–5.3)
POTASSIUM SERPL-SCNC: 3.7 MMOL/L — SIGNIFICANT CHANGE UP (ref 3.5–5.3)
PROT SERPL-MCNC: 7.1 GM/DL — SIGNIFICANT CHANGE UP (ref 6–8.3)
RBC # BLD: 4.27 M/UL — SIGNIFICANT CHANGE UP (ref 3.8–5.2)
RBC # FLD: 12.4 % — SIGNIFICANT CHANGE UP (ref 10.3–14.5)
SODIUM SERPL-SCNC: 141 MMOL/L — SIGNIFICANT CHANGE UP (ref 135–145)
WBC # BLD: 5.86 K/UL — SIGNIFICANT CHANGE UP (ref 3.8–10.5)
WBC # FLD AUTO: 5.86 K/UL — SIGNIFICANT CHANGE UP (ref 3.8–10.5)

## 2023-08-27 PROCEDURE — 74183 MRI ABD W/O CNTR FLWD CNTR: CPT | Mod: 26,MF

## 2023-08-27 PROCEDURE — G1004: CPT

## 2023-08-27 RX ORDER — ZOLPIDEM TARTRATE 10 MG/1
5 TABLET ORAL AT BEDTIME
Refills: 0 | Status: DISCONTINUED | OUTPATIENT
Start: 2023-08-27 | End: 2023-08-27

## 2023-08-27 RX ORDER — METOPROLOL TARTRATE 50 MG
1 TABLET ORAL
Qty: 0 | Refills: 0 | DISCHARGE

## 2023-08-27 RX ORDER — ZOLPIDEM TARTRATE 10 MG/1
5 TABLET ORAL AT BEDTIME
Refills: 0 | Status: DISCONTINUED | OUTPATIENT
Start: 2023-08-27 | End: 2023-08-28

## 2023-08-27 RX ORDER — CYCLOBENZAPRINE HYDROCHLORIDE 10 MG/1
1 TABLET, FILM COATED ORAL
Qty: 0 | Refills: 0 | DISCHARGE

## 2023-08-27 RX ORDER — OXYCODONE AND ACETAMINOPHEN 5; 325 MG/1; MG/1
1 TABLET ORAL
Qty: 0 | Refills: 0 | DISCHARGE

## 2023-08-27 RX ORDER — FENTANYL CITRATE 50 UG/ML
125 INJECTION INTRAVENOUS
Qty: 0 | Refills: 0 | DISCHARGE

## 2023-08-27 RX ORDER — ALPRAZOLAM 0.25 MG
0.25 TABLET ORAL ONCE
Refills: 0 | Status: DISCONTINUED | OUTPATIENT
Start: 2023-08-27 | End: 2023-08-27

## 2023-08-27 RX ORDER — ALPRAZOLAM 0.25 MG
1 TABLET ORAL
Qty: 0 | Refills: 0 | DISCHARGE

## 2023-08-27 RX ADMIN — SENNA PLUS 2 TABLET(S): 8.6 TABLET ORAL at 22:47

## 2023-08-27 RX ADMIN — ZOLPIDEM TARTRATE 5 MILLIGRAM(S): 10 TABLET ORAL at 01:26

## 2023-08-27 RX ADMIN — ZOLPIDEM TARTRATE 5 MILLIGRAM(S): 10 TABLET ORAL at 22:47

## 2023-08-27 RX ADMIN — Medication 0.25 MILLIGRAM(S): at 11:49

## 2023-08-27 RX ADMIN — Medication 25 MILLIGRAM(S): at 10:28

## 2023-08-27 RX ADMIN — MORPHINE SULFATE 2 MILLIGRAM(S): 50 CAPSULE, EXTENDED RELEASE ORAL at 18:59

## 2023-08-27 RX ADMIN — PANTOPRAZOLE SODIUM 40 MILLIGRAM(S): 20 TABLET, DELAYED RELEASE ORAL at 10:28

## 2023-08-27 RX ADMIN — PANTOPRAZOLE SODIUM 40 MILLIGRAM(S): 20 TABLET, DELAYED RELEASE ORAL at 22:54

## 2023-08-27 RX ADMIN — SODIUM CHLORIDE 125 MILLILITER(S): 9 INJECTION INTRAMUSCULAR; INTRAVENOUS; SUBCUTANEOUS at 10:26

## 2023-08-27 NOTE — PHARMACOTHERAPY INTERVENTION NOTE - COMMENTS
Medication, history complete, reviewed medications with patient and confirmed with doctor first med.

## 2023-08-27 NOTE — PROGRESS NOTE ADULT - SUBJECTIVE AND OBJECTIVE BOX
63F w/PMH HLD, FM, presents c/o sudden onset of nausea and epigastric abd pain 3 days ago. Pt endorses that while sitting on her couch, she suddenly had nausea associated w/ severe diaphoresis, dizziness. followed by intractable vomiting. The next day she no longer had any vomiting or dizziness, but continued to have constant nausea w/ epigastric pain. She was concerned that she may have an ulcer as she did 30 yrs ago, although she does not recall her symptoms from that time.  Current pain is relieved when she eats something but then followed by severe pain shortly afterward.  Pain is severe, 8/10, nonradiating, no exacerbating factors, seems to wax and wane but overall constant. Over past few days, she's eaten soup and grilled cheese sandwiches.      CT a/p (+con) Central intrahepatic biliary and common bile ductal dilatation to the level of the ampulla. Pancreatic ductal dilatation.  Small density at the proximal to the lumenabove the level of the ampulla, which may be due to ingested content versus focal lesion. Recommend correlation and follow-up (MRCP/MR and direct visualization) to exclude underlying biliary/ampullary pathology.     US RUQ: Intrahepatic and extrahepatic biliary ductal dilatation with the common bile duct measuring up to 13 mm as well as mild dilatation of the main pancreatic duct and gallbladder distention. Findings are concerning for obstruction at the level of the ampulla, recommend further evaluation with MRCP. 63F w/PMH HLD, FM, presents c/o sudden onset of nausea and epigastric abd pain 3 days ago. Pt endorses that while sitting on her couch, she suddenly had nausea associated w/ severe diaphoresis, dizziness. followed by intractable vomiting. The next day she no longer had any vomiting or dizziness, but continued to have constant nausea w/ epigastric pain. She was concerned that she may have an ulcer as she did 30 yrs ago, although she does not recall her symptoms from that time.  Current pain is relieved when she eats something but then followed by severe pain shortly afterward.  Pain is severe, 8/10, nonradiating, no exacerbating factors, seems to wax and wane but overall constant. Over past few days, she's eaten soup and grilled cheese sandwiches.      CT a/p (+con) Central intrahepatic biliary and common bile ductal dilatation to the level of the ampulla. Pancreatic ductal dilatation.  Small density at the proximal to the lumenabove the level of the ampulla, which may be due to ingested content versus focal lesion. Recommend correlation and follow-up (MRCP/MR and direct visualization) to exclude underlying biliary/ampullary pathology.     US RUQ: Intrahepatic and extrahepatic biliary ductal dilatation with the common bile duct measuring up to 13 mm as well as mild dilatation of the main pancreatic duct and gallbladder distention. Findings are concerning for obstruction at the level of the ampulla, recommend further evaluation with MRCP.    8/27  Patient seen and examined following MRCP. Patient wants to eat, but still feels sense of nausea. No vomiting. no sob, no cp.     Vital Signs Last 24 Hrs  T(C): 36.8 (27 Aug 2023 16:45), Max: 36.8 (27 Aug 2023 16:45)  T(F): 98.3 (27 Aug 2023 16:45), Max: 98.3 (27 Aug 2023 16:45)  HR: 79 (27 Aug 2023 16:45) (69 - 83)  BP: 145/75 (27 Aug 2023 16:45) (142/70 - 169/87)  BP(mean): --  RR: 18 (27 Aug 2023 16:45) (18 - 19)  SpO2: 100% (27 Aug 2023 16:45) (98% - 100%)    Parameters below as of 27 Aug 2023 16:45  Patient On (Oxygen Delivery Method): room air                          13.2   5.86  )-----------( 223      ( 27 Aug 2023 09:03 )             38.8     08-27    141  |  112<H>  |  7   ----------------------------<  96  3.7   |  25  |  0.77    Ca    9.2      27 Aug 2023 09:03  Mg     2.4     08-26    TPro  7.1  /  Alb  4.0  /  TBili  0.8  /  DBili  x   /  AST  15  /  ALT  17  /  AlkPhos  67  08-27    < from: MR MRCP w/wo IV Cont (08.27.23 @ 13:19) >  IMPRESSION:  Mild biliary ductal dilatation with layering contents within the duct   suggestive of sludge.  No discrete common duct stone.    < end of copied text >

## 2023-08-27 NOTE — PATIENT PROFILE ADULT - FALL HARM RISK - HARM RISK INTERVENTIONS

## 2023-08-28 ENCOUNTER — TRANSCRIPTION ENCOUNTER (OUTPATIENT)
Age: 63
End: 2023-08-28

## 2023-08-28 VITALS
RESPIRATION RATE: 18 BRPM | OXYGEN SATURATION: 100 % | SYSTOLIC BLOOD PRESSURE: 134 MMHG | DIASTOLIC BLOOD PRESSURE: 72 MMHG | TEMPERATURE: 98 F | HEART RATE: 91 BPM

## 2023-08-28 DIAGNOSIS — R42 DIZZINESS AND GIDDINESS: ICD-10-CM

## 2023-08-28 PROCEDURE — 99238 HOSP IP/OBS DSCHRG MGMT 30/<: CPT

## 2023-08-28 RX ORDER — PANTOPRAZOLE SODIUM 20 MG/1
1 TABLET, DELAYED RELEASE ORAL
Qty: 7 | Refills: 0
Start: 2023-08-28 | End: 2023-09-03

## 2023-08-28 RX ORDER — CYCLOBENZAPRINE HYDROCHLORIDE 10 MG/1
1 TABLET, FILM COATED ORAL
Refills: 0 | DISCHARGE

## 2023-08-28 RX ORDER — ZOLPIDEM TARTRATE 10 MG/1
1 TABLET ORAL
Refills: 0 | DISCHARGE

## 2023-08-28 RX ORDER — CELECOXIB 200 MG/1
1 CAPSULE ORAL
Refills: 0 | DISCHARGE

## 2023-08-28 RX ORDER — FENTANYL CITRATE 50 UG/ML
1 INJECTION INTRAVENOUS
Refills: 0 | DISCHARGE

## 2023-08-28 RX ORDER — NALOXONE HYDROCHLORIDE 4 MG/.1ML
4 SPRAY NASAL
Qty: 1 | Refills: 0
Start: 2023-08-28

## 2023-08-28 RX ADMIN — ONDANSETRON 4 MILLIGRAM(S): 8 TABLET, FILM COATED ORAL at 15:36

## 2023-08-28 RX ADMIN — Medication 25 MILLIGRAM(S): at 10:02

## 2023-08-28 RX ADMIN — MORPHINE SULFATE 2 MILLIGRAM(S): 50 CAPSULE, EXTENDED RELEASE ORAL at 07:01

## 2023-08-28 RX ADMIN — PANTOPRAZOLE SODIUM 40 MILLIGRAM(S): 20 TABLET, DELAYED RELEASE ORAL at 10:01

## 2023-08-28 RX ADMIN — MORPHINE SULFATE 2 MILLIGRAM(S): 50 CAPSULE, EXTENDED RELEASE ORAL at 11:17

## 2023-08-28 NOTE — DISCHARGE NOTE PROVIDER - HOSPITAL COURSE
Patient is a 63 y.o. female who presents with nausea and epigastric pain with PMH HLD, Fibromyalgia, migraines, chronic back pain and h/o peptic ulcer. Will discharge on soft bland diet and follow up with GI - Dr. Hobson for EGD to be done on 8/31/23.     < from: MR MRCP w/wo IV Cont (08.27.23 @ 13:19) >      IMPRESSION:  Mild biliary ductal dilatation with layering contents within the duct   suggestive of sludge.  No discrete common duct stone.        --- End of Report ---      < end of copied text >    < from: CT Abdomen and Pelvis w/ IV Cont (08.26.23 @ 21:32) >    *** ADDENDUM # 1 ***    Addendum: BILE DUCTS/GALLBLADDER: Mild central intrahepatic biliary   dilatation. Dilated common bile duct (1.2 cm) to the level of the   ampulla. No significant gallbladder wall edema.    --- End of Report ---    < end of copied text >

## 2023-08-28 NOTE — DISCHARGE NOTE PROVIDER - NSDCCPCAREPLAN_GEN_ALL_CORE_FT
PRINCIPAL DISCHARGE DIAGNOSIS  Diagnosis: Dizziness  Assessment and Plan of Treatment:       SECONDARY DISCHARGE DIAGNOSES  Diagnosis: Epigastric pain  Assessment and Plan of Treatment: EGD scheduled for 8/31/23 to evaluate mild CBC dilatation

## 2023-08-28 NOTE — CONSULT NOTE ADULT - ASSESSMENT
Epigastric pain-resolving  Incidental mild CBD dilatation  LFTs,MRCP Normal  RECEGD> Colonoscopy  Soft bland diet  The Pt prefers to have the procedures in an Out Pt setting-Scheduled for Thursday

## 2023-08-28 NOTE — DISCHARGE NOTE PROVIDER - CARE PROVIDER_API CALL
Yann Hobson  Gastroenterology  [address], [address]  [place] [address]  Phone: [phone]  Fax: [phone]  Established Patient  Follow Up Time: 1-3 days

## 2023-08-28 NOTE — DISCHARGE NOTE NURSING/CASE MANAGEMENT/SOCIAL WORK - FLU SEASON?
The Service to OBGYN order in workqueue [id number] requested on 11/18/2019 has been removed as, patient declined services. Ordering provider has been notified.Patient no showed appointment    Please contact patient, if further communication is needed.   No

## 2023-08-28 NOTE — CONSULT NOTE ADULT - SUBJECTIVE AND OBJECTIVE BOX
HPI:  HPI:  63F w/PMH HLD, FM, presents c/o sudden onset of nausea and epigastric abd pain 3 days ago. Pt endorses that while sitting on her couch, she suddenly had nausea associated w/ severe diaphoresis, dizziness. followed by intractable vomiting. The next day she no longer had any vomiting or dizziness, but continued to have constant nausea w/ epigastric pain. She was concerned that she may have an ulcer as she did 30 yrs ago, although she does not recall her symptoms from that time.  Current pain is relieved when she eats something but then followed by severe pain shortly afterward.  Pain is severe, 8/10, nonradiating, no exacerbating factors, seems to wax and wane but overall constant. Over past few days, she's eaten soup and grilled cheese sandwiches.      In ED, labs wnl, lipase neg, trop neg, CT head neg (for initial dizziness, although she's no longer dizzy),  given meclizine, IV tylenol, ivf.  CT a/p (+con) Central intrahepatic biliary and common bile ductal dilatation to the level of the ampulla. Pancreatic ductal dilatation.  Small density at the proximal to the lumenabove the level of the ampulla, which may be due to ingested content versus focal lesion. Recommend correlation and follow-up (MRCP/MR and direct visualization) to exclude underlying biliary/ampullary pathology.     US RUQ: Intrahepatic and extrahepatic biliary ductal dilatation with the common bile duct measuring up to 13 mm as well as mild dilatation of the main pancreatic duct and gallbladder distention. Findings are concerning for obstruction at the level of the ampulla, recommend further evaluation with MRCP.    PAST MEDICAL & SURGICAL HISTORY:  Fibromyalgia  HLD (hyperlipidemia)  Migraines  Chronic back pain  Premature atrial complex  Ventricular premature beats  H/O laminectomy  H/O discectomy      Review of Systems:   CONSTITUTIONAL: No fever.  EYES: No eye pain or discharge.  ENMT:  No sinus or throat pain  NECK: No pain or stiffness  RESPIRATORY: No cough, wheezing, chills or hemoptysis; No shortness of breath  CARDIOVASCULAR: No chest pain, palpitations, dizziness, or leg swelling  GASTROINTESTINAL: See HPI   GENITOURINARY: No dysuria or incontinence  NEUROLOGICAL: No headaches, memory loss, loss of strength, numbness, or tremors  SKIN: No rashes.  MUSCULOSKELETAL: No joint pain or swelling; No muscle, back, or extremity pain  PSYCHIATRIC: No depression, anxiety, mood swings, or difficulty sleeping    Social History:   lives w /  denies smoking, occ etoh    FAMILY HISTORY:  Family history of Parkinson's diseasedeceased, father/grandfather  Family history of neuromuscular dysfunction in mother       MEDICATIONS  (STANDING):  fentaNYL   Patch 100 MICROgram(s)/Hr. 1 Patch Transdermal every 48 hours  metoprolol succinate ER 25 milliGRAM(s) Oral daily  naloxone Injectable 0.4 milliGRAM(s) IV Push once  pantoprazole  Injectable 40 milliGRAM(s) IV Push two times a day  polyethylene glycol 3350 17 Gram(s) Oral daily  senna 2 Tablet(s) Oral at bedtime  sodium chloride 0.9%. 1000 milliLiter(s) (125 mL/Hr) IV Continuous <Continuous>    MEDICATIONS  (PRN):  aluminum hydroxide/magnesium hydroxide/simethicone Suspension 30 milliLiter(s) Oral every 4 hours PRN Dyspepsia  bisacodyl 5 milliGRAM(s) Oral daily PRN Constipation  cyclobenzaprine 5 milliGRAM(s) Oral at bedtime PRN Muscle Spasm  melatonin 3 milliGRAM(s) Oral at bedtime PRN Insomnia  morphine  - Injectable 2 milliGRAM(s) IV Push every 4 hours PRN Moderate Pain (4 - 6)  morphine  - Injectable 4 milliGRAM(s) IV Push every 4 hours PRN Severe Pain (7 - 10)  ondansetron Injectable 4 milliGRAM(s) IV Push every 8 hours PRN Nausea and/or Vomiting      PHYSICAL EXAM:  Vital Signs Last 24 Hrs  T(C): 36.3 (26 Aug 2023 23:12), Max: 36.8 (26 Aug 2023 18:25)  T(F): 97.4 (26 Aug 2023 23:12), Max: 98.3 (26 Aug 2023 18:25)  HR: 69 (26 Aug 2023 23:12) (69 - 80)  BP: 169/87 (26 Aug 2023 23:12) (154/84 - 169/87)  BP(mean): 102 (26 Aug 2023 16:18) (102 - 102)  RR: 19 (26 Aug 2023 23:12) (18 - 19)  SpO2: 100% (26 Aug 2023 23:12) (97% - 100%)    Parameters below as of 26 Aug 2023 23:12  Patient On (Oxygen Delivery Method): room air  GENERAL: NAD,   HEAD:  Atraumatic, Normocephalic  EYES: EOMI, PERRLA, conjunctiva and sclera clear  ENT: normal hearing, no nasal discharge, throat clear, dentition normal  NECK: Supple, No JVD, no LAD, no thyromegaly   CHEST/LUNG: Clear to auscultation bilaterally; No wheeze, respirations unlabored  HEART: Regular rate and rhythm; No murmurs, rubs, or gallops  ABDOMEN: Soft, ++Epigastric TTP,  Nondistended; Bowel sounds present, no HSM  EXTREMITIES:  2+ Peripheral Pulses, No clubbing, cyanosis, or edema  PSYCH: AAOx3, normal behavior  NEUROLOGY: non-focal, sensory and cn 2-12 intact, speech/language intact  SKIN: No visible rashes or lesions    LABS:                        14.5   6.02  )-----------( 271      ( 26 Aug 2023 18:34 )             43.1         139  |  109<H>  |  11  ----------------------------<  129<H>  3.9   |  29  |  0.97    Ca    9.7      26 Aug 2023 18:34  Mg     2.4         TPro  8.5<H>  /  Alb  4.7  /  TBili  0.5  /  DBili  x   /  AST  15  /  ALT  25  /  AlkPhos  77            Urinalysis Basic - ( 26 Aug 2023 18:34 )    Color: Yellow / Appearance: Clear / S.010 / pH: x  Gluc: 129 mg/dL / Ketone: Negative  / Bili: Negative / Urobili: Negative   Blood: x / Protein: Negative / Nitrite: Negative   Leuk Esterase: Negative / RBC: x / WBC x   Sq Epi: x / Non Sq Epi: x / Bacteria: x        RADIOLOGY & ADDITIONAL TESTS:    Imaging Personally Reviewed:  CT a/p (+con) Central intrahepatic biliary and common bile ductal dilatation to the level of the ampulla. Pancreatic ductal dilatation.  Small density at the proximal to the lumenabove the level of the ampulla, which may be due to ingested content versus focal lesion. Recommend correlation and follow-up (MRCP/MR and direct visualization) to exclude underlying biliary/ampullary pathology.    CT head: neg    US RUQ: Intrahepatic and extrahepatic biliary ductal dilatation with the common bile duct measuring up to 13 mm as well as mild dilatation of the main pancreatic duct and gallbladder distention. Findings are concerning for obstruction at the level of the ampulla, recommend further evaluation with MRCP.    EKG Personally Reviewed:  n/a    Consultant(s) Notes Reviewed:      Care Discussed with Consultants/Other Providers:   (26 Aug 2023 23:23)      PAST MEDICAL & SURGICAL HISTORY:  Fibromyalgia      HLD (hyperlipidemia)      Migraines      Premature atrial complex      Ventricular premature beats      Smokeless tobacco use      Migraines      HLD (hyperlipidemia)      H/O laminectomy      H/O discectomy          MEDICATIONS  (STANDING):  fentaNYL   Patch 100 MICROgram(s)/Hr. 1 Patch Transdermal every 48 hours  metoprolol succinate ER 25 milliGRAM(s) Oral daily  naloxone Injectable 0.4 milliGRAM(s) IV Push once  pantoprazole  Injectable 40 milliGRAM(s) IV Push two times a day  polyethylene glycol 3350 17 Gram(s) Oral daily  senna 2 Tablet(s) Oral at bedtime  sodium chloride 0.9%. 1000 milliLiter(s) (125 mL/Hr) IV Continuous <Continuous>    MEDICATIONS  (PRN):  aluminum hydroxide/magnesium hydroxide/simethicone Suspension 30 milliLiter(s) Oral every 4 hours PRN Dyspepsia  bisacodyl 5 milliGRAM(s) Oral daily PRN Constipation  cyclobenzaprine 5 milliGRAM(s) Oral at bedtime PRN Muscle Spasm  melatonin 3 milliGRAM(s) Oral at bedtime PRN Insomnia  morphine  - Injectable 2 milliGRAM(s) IV Push every 4 hours PRN Moderate Pain (4 - 6)  morphine  - Injectable 4 milliGRAM(s) IV Push every 4 hours PRN Severe Pain (7 - 10)  ondansetron Injectable 4 milliGRAM(s) IV Push every 8 hours PRN Nausea and/or Vomiting  zolpidem 5 milliGRAM(s) Oral at bedtime PRN Insomnia  zolpidem 5 milliGRAM(s) Oral at bedtime PRN Insomnia      Allergies    predniSONE (Unknown)  Nucynta (Unknown)  Motrin (Hives; Fever)  Effexor (Unknown)    Intolerances        SOCIAL HISTORY:    FAMILY HISTORY:  Family history of Parkinson's disease  , father/grandfather    Family history of neuromuscular dysfunction  in mother      Non-contributory    REVIEW OF SYSTEMS      General:	    Respiratory and Thorax:  	  Cardiovascular:	    Gastrointestinal:	    Musculoskeletal:	   Vital Signs Last 24 Hrs  T(C): 36.7 (28 Aug 2023 15:54), Max: 36.8 (28 Aug 2023 08:20)  T(F): 98 (28 Aug 2023 15:54), Max: 98.2 (28 Aug 2023 08:20)  HR: 91 (28 Aug 2023 15:54) (89 - 91)  BP: 134/72 (28 Aug 2023 15:54) (134/72 - 172/96)  BP(mean): --  RR: 18 (28 Aug 2023 15:54) (10 - 18)  SpO2: 100% (28 Aug 2023 15:54) (98% - 100%)    Parameters below as of 28 Aug 2023 15:54  Patient On (Oxygen Delivery Method): room air        HEENT :No Pallor.No icterus. EOMI,PERLAA  Chest : Clear to Auscultation  CVS : S1S2 Normal.No murmurs.  Abdomen: Soft.Epigastric tenderness  .Normal bowel sounds.No Organomegaly.  CNS: Alert.Oriented to Time,Place and Person.No focal deficit.  EXT: Normal Range of motion.No pitting edema.    LABS:                        13.2   5.86  )-----------( 223      ( 27 Aug 2023 09:03 )             38.8     08-    141  |  112<H>  |  7   ----------------------------<  96  3.7   |  25  |  0.77    Ca    9.2      27 Aug 2023 09:03    TPro  7.1  /  Alb  4.0  /  TBili  0.8  /  DBili  x   /  AST  15  /  ALT  17  /  AlkPhos  67  08-      LIVER FUNCTIONS - ( 27 Aug 2023 09:03 )  Alb: 4.0 g/dL / Pro: 7.1 gm/dL / ALK PHOS: 67 U/L / ALT: 17 U/L / AST: 15 U/L / GGT: x             RADIOLOGY & ADDITIONAL STUDIES:

## 2023-08-28 NOTE — DISCHARGE NOTE PROVIDER - NSDCMRMEDTOKEN_GEN_ALL_CORE_FT
cyclobenzaprine 5 mg oral tablet: 1 orally once a day (at bedtime) ***1-2 tabs as needed***  fentaNYL 100 mcg/hr transdermal film, extended release: 1 patch transdermally every 48 hours  metoprolol succinate 25 mg oral tablet, extended release: 1 tab(s) orally once a day  naloxone 4 mg/0.1 mL nasal spray: 4 milligram(s) intranasally  Percocet 10/325 oral tablet: 1 tab(s) orally every 6 hours  Protonix 40 mg oral delayed release tablet: 1 tab(s) orally once a day  Xanax 0.25 mg oral tablet: 1 tab(s) orally 3 times a day, As Needed  zolpidem 10 mg oral tablet: 1 tab(s) orally once a day (at bedtime)

## 2023-08-28 NOTE — DISCHARGE NOTE NURSING/CASE MANAGEMENT/SOCIAL WORK - PATIENT PORTAL LINK FT
You can access the FollowMyHealth Patient Portal offered by [facility] by registering at the following website: [email or web address]. By joining [facility] FollowMyHealth portal, you will also be able to view your health information using other applications (apps) compatible with our system.

## 2023-08-28 NOTE — PROGRESS NOTE ADULT - SUBJECTIVE AND OBJECTIVE BOX
63F w/PMH HLD, FM, presents c/o sudden onset of nausea and epigastric abd pain 3 days ago. Pt endorses that while sitting on her couch, she suddenly had nausea associated w/ severe diaphoresis, dizziness. followed by intractable vomiting. The next day she no longer had any vomiting or dizziness, but continued to have constant nausea w/ epigastric pain. She was concerned that she may have an ulcer as she did 30 yrs ago, although she does not recall her symptoms from that time.  Current pain is relieved when she eats something but then followed by severe pain shortly afterward.  Pain is severe, 8/10, nonradiating, no exacerbating factors, seems to wax and wane but overall constant. Over past few days, she's eaten soup and grilled cheese sandwiches.      CT a/p (+con) Central intrahepatic biliary and common bile ductal dilatation to the level of the ampulla. Pancreatic ductal dilatation.  Small density at the proximal to the lumenabove the level of the ampulla, which may be due to ingested content versus focal lesion. Recommend correlation and follow-up (MRCP/MR and direct visualization) to exclude underlying biliary/ampullary pathology.     US RUQ: Intrahepatic and extrahepatic biliary ductal dilatation with the common bile duct measuring up to 13 mm as well as mild dilatation of the main pancreatic duct and gallbladder distention. Findings are concerning for obstruction at the level of the ampulla, recommend further evaluation with MRCP.    8/27  Patient seen and examined following MRCP. Patient wants to eat, but still feels sense of nausea. No vomiting. no sob, no cp.     8/28        Vital Signs Last 24 Hrs  T(C): 36.8 (28 Aug 2023 08:20), Max: 36.8 (27 Aug 2023 16:45)  T(F): 98.2 (28 Aug 2023 08:20), Max: 98.3 (27 Aug 2023 16:45)  HR: 89 (28 Aug 2023 08:20) (79 - 91)  BP: 164/97 (28 Aug 2023 08:20) (145/75 - 172/96)  BP(mean): --  RR: 18 (28 Aug 2023 08:20) (10 - 18)  SpO2: 100% (28 Aug 2023 08:20) (98% - 100%)    Parameters below as of 28 Aug 2023 08:20  Patient On (Oxygen Delivery Method): room air                              13.2   5.86  )-----------( 223      ( 27 Aug 2023 09:03 )             38.8     08-27    141  |  112<H>  |  7   ----------------------------<  96  3.7   |  25  |  0.77    Ca    9.2      27 Aug 2023 09:03  Mg     2.4     08-26    TPro  7.1  /  Alb  4.0  /  TBili  0.8  /  DBili  x   /  AST  15  /  ALT  17  /  AlkPhos  67  08-27    < from: MR MRCP w/wo IV Cont (08.27.23 @ 13:19) >  IMPRESSION:  Mild biliary ductal dilatation with layering contents within the duct   suggestive of sludge.  No discrete common duct stone.    < end of copied text >         63F w/PMH HLD, FM, presents c/o sudden onset of nausea and epigastric abd pain 3 days ago. Pt endorses that while sitting on her couch, she suddenly had nausea associated w/ severe diaphoresis, dizziness. followed by intractable vomiting. The next day she no longer had any vomiting or dizziness, but continued to have constant nausea w/ epigastric pain. She was concerned that she may have an ulcer as she did 30 yrs ago, although she does not recall her symptoms from that time.  Current pain is relieved when she eats something but then followed by severe pain shortly afterward.  Pain is severe, 8/10, nonradiating, no exacerbating factors, seems to wax and wane but overall constant. Over past few days, she's eaten soup and grilled cheese sandwiches.      CT a/p (+con) Central intrahepatic biliary and common bile ductal dilatation to the level of the ampulla. Pancreatic ductal dilatation.  Small density at the proximal to the lumenabove the level of the ampulla, which may be due to ingested content versus focal lesion. Recommend correlation and follow-up (MRCP/MR and direct visualization) to exclude underlying biliary/ampullary pathology.     US RUQ: Intrahepatic and extrahepatic biliary ductal dilatation with the common bile duct measuring up to 13 mm as well as mild dilatation of the main pancreatic duct and gallbladder distention. Findings are concerning for obstruction at the level of the ampulla, recommend further evaluation with MRCP.    8/27  Patient seen and examined following MRCP. Patient wants to eat, but still feels sense of nausea. No vomiting. no sob, no cp.     8/28  Patient did not tolerate regular diet. Eager to go home and follow up with GI as op.         Vital Signs Last 24 Hrs  T(C): 36.8 (28 Aug 2023 08:20), Max: 36.8 (27 Aug 2023 16:45)  T(F): 98.2 (28 Aug 2023 08:20), Max: 98.3 (27 Aug 2023 16:45)  HR: 89 (28 Aug 2023 08:20) (79 - 91)  BP: 164/97 (28 Aug 2023 08:20) (145/75 - 172/96)  BP(mean): --  RR: 18 (28 Aug 2023 08:20) (10 - 18)  SpO2: 100% (28 Aug 2023 08:20) (98% - 100%)    Parameters below as of 28 Aug 2023 08:20  Patient On (Oxygen Delivery Method): room air                              13.2   5.86  )-----------( 223      ( 27 Aug 2023 09:03 )             38.8     08-27    141  |  112<H>  |  7   ----------------------------<  96  3.7   |  25  |  0.77    Ca    9.2      27 Aug 2023 09:03  Mg     2.4     08-26    TPro  7.1  /  Alb  4.0  /  TBili  0.8  /  DBili  x   /  AST  15  /  ALT  17  /  AlkPhos  67  08-27    < from: MR MRCP w/wo IV Cont (08.27.23 @ 13:19) >  IMPRESSION:  Mild biliary ductal dilatation with layering contents within the duct   suggestive of sludge.  No discrete common duct stone.    < end of copied text >

## 2023-09-01 DIAGNOSIS — K83.8 OTHER SPECIFIED DISEASES OF BILIARY TRACT: ICD-10-CM

## 2023-09-01 DIAGNOSIS — Z72.0 TOBACCO USE: ICD-10-CM

## 2023-09-01 DIAGNOSIS — E78.5 HYPERLIPIDEMIA, UNSPECIFIED: ICD-10-CM

## 2023-09-01 DIAGNOSIS — Z86.73 PERSONAL HISTORY OF TRANSIENT ISCHEMIC ATTACK (TIA), AND CEREBRAL INFARCTION WITHOUT RESIDUAL DEFICITS: ICD-10-CM

## 2023-09-01 DIAGNOSIS — M54.9 DORSALGIA, UNSPECIFIED: ICD-10-CM

## 2023-09-01 DIAGNOSIS — Z98.890 OTHER SPECIFIED POSTPROCEDURAL STATES: ICD-10-CM

## 2023-09-01 DIAGNOSIS — R42 DIZZINESS AND GIDDINESS: ICD-10-CM

## 2023-09-01 DIAGNOSIS — G43.909 MIGRAINE, UNSPECIFIED, NOT INTRACTABLE, WITHOUT STATUS MIGRAINOSUS: ICD-10-CM

## 2023-09-01 DIAGNOSIS — G89.29 OTHER CHRONIC PAIN: ICD-10-CM

## 2024-09-13 NOTE — PATIENT PROFILE ADULT - HAVE YOU EXPERIENCED VIOLENCE OR A TRAUMATIC EVENT?
Problem: Adult Inpatient Plan of Care  Goal: Plan of Care Review  Outcome: Progressing  Flowsheets (Taken 9/13/2024 1040)  Plan of Care Reviewed With: patient  Goal: Patient-Specific Goal (Individualized)  Outcome: Progressing  Flowsheets (Taken 9/13/2024 1040)  Individualized Care Needs: elevate legs, blanket and pillow  Anxieties, Fears or Concerns: none today  Goal: Absence of Hospital-Acquired Illness or Injury  Outcome: Progressing  Intervention: Prevent Infection  Flowsheets (Taken 9/13/2024 1040)  Infection Prevention:   equipment surfaces disinfected   hand hygiene promoted   personal protective equipment utilized  Goal: Optimal Comfort and Wellbeing  Outcome: Progressing  Intervention: Provide Person-Centered Care  Flowsheets (Taken 9/13/2024 1040)  Trust Relationship/Rapport:   care explained   thoughts/feelings acknowledged   choices provided   emotional support provided   empathic listening provided   questions answered   questions encouraged   reassurance provided     
no

## 2024-10-16 NOTE — H&P ADULT - NSHPPOADEEPVENOUSTHROMB_GEN_A_CORE
Post-Care Instructions: I reviewed with the patient in detail post-care instructions. Patient is to wear sunprotection, and avoid picking at any of the treated lesions. Pt may apply Vaseline to crusted or scabbing areas. Render Post-Care Instructions In Note?: no no Number Of Freeze-Thaw Cycles: 3 freeze-thaw cycles Consent: The patient's consent was obtained including but not limited to risks of crusting, scabbing, blistering, scarring, darker or lighter pigmentary change, recurrence, incomplete removal and infection. Detail Level: Detailed Show Aperture Variable?: Yes Duration Of Freeze Thaw-Cycle (Seconds): 5

## 2025-08-11 ENCOUNTER — EMERGENCY (EMERGENCY)
Facility: HOSPITAL | Age: 65
LOS: 1 days | End: 2025-08-11
Attending: STUDENT IN AN ORGANIZED HEALTH CARE EDUCATION/TRAINING PROGRAM | Admitting: STUDENT IN AN ORGANIZED HEALTH CARE EDUCATION/TRAINING PROGRAM
Payer: COMMERCIAL

## 2025-08-11 VITALS
SYSTOLIC BLOOD PRESSURE: 156 MMHG | OXYGEN SATURATION: 98 % | HEART RATE: 108 BPM | TEMPERATURE: 98 F | RESPIRATION RATE: 16 BRPM | DIASTOLIC BLOOD PRESSURE: 77 MMHG

## 2025-08-11 VITALS
SYSTOLIC BLOOD PRESSURE: 124 MMHG | HEART RATE: 98 BPM | RESPIRATION RATE: 18 BRPM | DIASTOLIC BLOOD PRESSURE: 68 MMHG | OXYGEN SATURATION: 97 % | TEMPERATURE: 98 F

## 2025-08-11 LAB
ALBUMIN SERPL ELPH-MCNC: 4.1 G/DL — SIGNIFICANT CHANGE UP (ref 3.3–5)
ALP SERPL-CCNC: 73 U/L — SIGNIFICANT CHANGE UP (ref 40–120)
ALT FLD-CCNC: 18 U/L — SIGNIFICANT CHANGE UP (ref 12–78)
ANION GAP SERPL CALC-SCNC: 5 MMOL/L — SIGNIFICANT CHANGE UP (ref 5–17)
APTT BLD: 28 SEC — SIGNIFICANT CHANGE UP (ref 26.1–36.8)
AST SERPL-CCNC: 18 U/L — SIGNIFICANT CHANGE UP (ref 15–37)
BASOPHILS # BLD AUTO: 0.03 K/UL — SIGNIFICANT CHANGE UP (ref 0–0.2)
BASOPHILS NFR BLD AUTO: 0.6 % — SIGNIFICANT CHANGE UP (ref 0–2)
BILIRUB SERPL-MCNC: 0.5 MG/DL — SIGNIFICANT CHANGE UP (ref 0.2–1.2)
BUN SERPL-MCNC: 11 MG/DL — SIGNIFICANT CHANGE UP (ref 7–23)
CALCIUM SERPL-MCNC: 9.4 MG/DL — SIGNIFICANT CHANGE UP (ref 8.5–10.1)
CHLORIDE SERPL-SCNC: 103 MMOL/L — SIGNIFICANT CHANGE UP (ref 96–108)
CO2 SERPL-SCNC: 32 MMOL/L — HIGH (ref 22–31)
CREAT SERPL-MCNC: 0.78 MG/DL — SIGNIFICANT CHANGE UP (ref 0.5–1.3)
EGFR: 85 ML/MIN/1.73M2 — SIGNIFICANT CHANGE UP
EGFR: 85 ML/MIN/1.73M2 — SIGNIFICANT CHANGE UP
EOSINOPHIL # BLD AUTO: 0.14 K/UL — SIGNIFICANT CHANGE UP (ref 0–0.5)
EOSINOPHIL NFR BLD AUTO: 2.8 % — SIGNIFICANT CHANGE UP (ref 0–6)
GLUCOSE SERPL-MCNC: 124 MG/DL — HIGH (ref 70–99)
HCT VFR BLD CALC: 41.3 % — SIGNIFICANT CHANGE UP (ref 34.5–45)
HGB BLD-MCNC: 13.8 G/DL — SIGNIFICANT CHANGE UP (ref 11.5–15.5)
IMM GRANULOCYTES # BLD AUTO: 0.02 K/UL — SIGNIFICANT CHANGE UP (ref 0–0.07)
IMM GRANULOCYTES NFR BLD AUTO: 0.4 % — SIGNIFICANT CHANGE UP (ref 0–0.9)
INR BLD: 0.9 RATIO — SIGNIFICANT CHANGE UP (ref 0.85–1.16)
LYMPHOCYTES # BLD AUTO: 2.01 K/UL — SIGNIFICANT CHANGE UP (ref 1–3.3)
LYMPHOCYTES NFR BLD AUTO: 39.6 % — SIGNIFICANT CHANGE UP (ref 13–44)
MCHC RBC-ENTMCNC: 30.5 PG — SIGNIFICANT CHANGE UP (ref 27–34)
MCHC RBC-ENTMCNC: 33.4 G/DL — SIGNIFICANT CHANGE UP (ref 32–36)
MCV RBC AUTO: 91.4 FL — SIGNIFICANT CHANGE UP (ref 80–100)
MONOCYTES # BLD AUTO: 0.37 K/UL — SIGNIFICANT CHANGE UP (ref 0–0.9)
MONOCYTES NFR BLD AUTO: 7.3 % — SIGNIFICANT CHANGE UP (ref 2–14)
NEUTROPHILS # BLD AUTO: 2.51 K/UL — SIGNIFICANT CHANGE UP (ref 1.8–7.4)
NEUTROPHILS NFR BLD AUTO: 49.3 % — SIGNIFICANT CHANGE UP (ref 43–77)
NRBC # BLD AUTO: 0 K/UL — SIGNIFICANT CHANGE UP (ref 0–0)
NRBC # FLD: 0 K/UL — SIGNIFICANT CHANGE UP (ref 0–0)
NRBC BLD AUTO-RTO: 0 /100 WBCS — SIGNIFICANT CHANGE UP (ref 0–0)
PLATELET # BLD AUTO: 204 K/UL — SIGNIFICANT CHANGE UP (ref 150–400)
PMV BLD: 10.2 FL — SIGNIFICANT CHANGE UP (ref 7–13)
POTASSIUM SERPL-MCNC: 3.4 MMOL/L — LOW (ref 3.5–5.3)
POTASSIUM SERPL-SCNC: 3.4 MMOL/L — LOW (ref 3.5–5.3)
PROT SERPL-MCNC: 7.4 G/DL — SIGNIFICANT CHANGE UP (ref 6–8.3)
PROTHROM AB SERPL-ACNC: 10.6 SEC — SIGNIFICANT CHANGE UP (ref 9.9–13.4)
RBC # BLD: 4.52 M/UL — SIGNIFICANT CHANGE UP (ref 3.8–5.2)
RBC # FLD: 12.3 % — SIGNIFICANT CHANGE UP (ref 10.3–14.5)
SODIUM SERPL-SCNC: 140 MMOL/L — SIGNIFICANT CHANGE UP (ref 135–145)
TROPONIN I, HIGH SENSITIVITY RESULT: 7.5 NG/L — SIGNIFICANT CHANGE UP
WBC # BLD: 5.08 K/UL — SIGNIFICANT CHANGE UP (ref 3.8–10.5)
WBC # FLD AUTO: 5.08 K/UL — SIGNIFICANT CHANGE UP (ref 3.8–10.5)

## 2025-08-11 PROCEDURE — 36415 COLL VENOUS BLD VENIPUNCTURE: CPT

## 2025-08-11 PROCEDURE — 0042T: CPT

## 2025-08-11 PROCEDURE — 85025 COMPLETE CBC W/AUTO DIFF WBC: CPT

## 2025-08-11 PROCEDURE — 70496 CT ANGIOGRAPHY HEAD: CPT | Mod: 26

## 2025-08-11 PROCEDURE — 85730 THROMBOPLASTIN TIME PARTIAL: CPT

## 2025-08-11 PROCEDURE — 93005 ELECTROCARDIOGRAM TRACING: CPT

## 2025-08-11 PROCEDURE — 70496 CT ANGIOGRAPHY HEAD: CPT

## 2025-08-11 PROCEDURE — 84484 ASSAY OF TROPONIN QUANT: CPT

## 2025-08-11 PROCEDURE — 80053 COMPREHEN METABOLIC PANEL: CPT

## 2025-08-11 PROCEDURE — 93010 ELECTROCARDIOGRAM REPORT: CPT

## 2025-08-11 PROCEDURE — 70450 CT HEAD/BRAIN W/O DYE: CPT

## 2025-08-11 PROCEDURE — 99285 EMERGENCY DEPT VISIT HI MDM: CPT | Mod: 25

## 2025-08-11 PROCEDURE — 85610 PROTHROMBIN TIME: CPT

## 2025-08-11 PROCEDURE — 70450 CT HEAD/BRAIN W/O DYE: CPT | Mod: 26,59

## 2025-08-11 PROCEDURE — 99285 EMERGENCY DEPT VISIT HI MDM: CPT

## 2025-08-11 PROCEDURE — 70498 CT ANGIOGRAPHY NECK: CPT

## 2025-08-11 PROCEDURE — 82962 GLUCOSE BLOOD TEST: CPT

## 2025-08-11 PROCEDURE — 96374 THER/PROPH/DIAG INJ IV PUSH: CPT | Mod: XU

## 2025-08-11 PROCEDURE — 70498 CT ANGIOGRAPHY NECK: CPT | Mod: 26

## 2025-08-11 RX ORDER — METOCLOPRAMIDE HCL 10 MG
10 TABLET ORAL ONCE
Refills: 0 | Status: COMPLETED | OUTPATIENT
Start: 2025-08-11 | End: 2025-08-11

## 2025-08-11 RX ORDER — OXYCODONE HYDROCHLORIDE AND ACETAMINOPHEN 10; 325 MG/1; MG/1
0 TABLET ORAL
Refills: 0 | DISCHARGE

## 2025-08-11 RX ORDER — OXYCODONE HYDROCHLORIDE AND ACETAMINOPHEN 10; 325 MG/1; MG/1
1 TABLET ORAL
Refills: 0 | DISCHARGE

## 2025-08-11 RX ORDER — METOPROLOL SUCCINATE 50 MG/1
25 TABLET, EXTENDED RELEASE ORAL DAILY
Refills: 0 | Status: ACTIVE | OUTPATIENT
Start: 2025-08-11 | End: 2026-07-10

## 2025-08-11 RX ORDER — CYCLOBENZAPRINE HYDROCHLORIDE 15 MG/1
0 CAPSULE, EXTENDED RELEASE ORAL
Refills: 0 | DISCHARGE

## 2025-08-11 RX ORDER — ALPRAZOLAM 0.5 MG
1 TABLET, EXTENDED RELEASE 24 HR ORAL
Refills: 0 | DISCHARGE

## 2025-08-11 RX ADMIN — Medication 10 MILLIGRAM(S): at 17:47

## 2025-08-12 RX ORDER — METOPROLOL SUCCINATE 50 MG/1
0.5 TABLET, EXTENDED RELEASE ORAL
Refills: 0 | DISCHARGE